# Patient Record
Sex: MALE | Race: WHITE | NOT HISPANIC OR LATINO | Employment: OTHER | URBAN - METROPOLITAN AREA
[De-identification: names, ages, dates, MRNs, and addresses within clinical notes are randomized per-mention and may not be internally consistent; named-entity substitution may affect disease eponyms.]

---

## 2023-08-29 PROBLEM — D22.39 MELANOCYTIC NEVI OF OTHER PARTS OF FACE: Status: ACTIVE | Noted: 2023-08-08

## 2023-08-29 PROBLEM — H16.223 KERATITIS SICCA, BILATERAL: Status: ACTIVE | Noted: 2023-08-29

## 2023-08-29 PROBLEM — M54.41 CHRONIC RIGHT-SIDED LOW BACK PAIN WITH RIGHT-SIDED SCIATICA: Status: ACTIVE | Noted: 2023-08-29

## 2023-08-29 PROBLEM — G47.33 OSA ON CPAP: Status: ACTIVE | Noted: 2023-08-29

## 2023-08-29 PROBLEM — H18.413 ARCUS SENILIS, BILATERAL: Status: ACTIVE | Noted: 2023-08-29

## 2023-08-29 PROBLEM — R20.2 PARESTHESIA OF SKIN: Status: ACTIVE | Noted: 2023-08-08

## 2023-08-29 PROBLEM — G89.29 CHRONIC RIGHT-SIDED LOW BACK PAIN WITH RIGHT-SIDED SCIATICA: Status: ACTIVE | Noted: 2023-08-29

## 2023-08-29 PROBLEM — M35.01 KERATITIS SICCA, BILATERAL (MULTI): Status: ACTIVE | Noted: 2023-08-29

## 2023-08-29 PROBLEM — E78.5 HYPERLIPIDEMIA: Status: ACTIVE | Noted: 2023-08-29

## 2023-08-29 PROBLEM — J45.909 ASTHMA (HHS-HCC): Status: ACTIVE | Noted: 2023-08-29

## 2023-08-29 PROBLEM — E11.9 TYPE 2 DIABETES MELLITUS (MULTI): Status: ACTIVE | Noted: 2023-08-29

## 2023-08-29 PROBLEM — D22.60 MELANOCYTIC NEVI OF UNSPECIFIED UPPER LIMB, INCLUDING SHOULDER: Status: ACTIVE | Noted: 2023-08-08

## 2023-08-29 PROBLEM — H02.836 DERMATOCHALASIS OF BOTH EYELIDS: Status: ACTIVE | Noted: 2023-08-29

## 2023-08-29 PROBLEM — I73.00 RAYNAUD'S PHENOMENON: Status: ACTIVE | Noted: 2023-08-29

## 2023-08-29 PROBLEM — D18.01 HEMANGIOMA OF SKIN AND SUBCUTANEOUS TISSUE: Status: ACTIVE | Noted: 2023-08-08

## 2023-08-29 PROBLEM — H02.833 DERMATOCHALASIS OF BOTH EYELIDS: Status: ACTIVE | Noted: 2023-08-29

## 2023-08-29 PROBLEM — L81.4 OTHER MELANIN HYPERPIGMENTATION: Status: ACTIVE | Noted: 2023-08-08

## 2023-08-29 PROBLEM — N50.9 DISORDER OF SCROTUM: Status: ACTIVE | Noted: 2023-08-29

## 2023-08-29 PROBLEM — L90.6 STRIAE ATROPHICAE: Status: ACTIVE | Noted: 2023-08-08

## 2023-08-29 PROBLEM — M48.061 LUMBAR SPINAL STENOSIS: Status: ACTIVE | Noted: 2023-08-29

## 2023-08-29 PROBLEM — I10 BENIGN ESSENTIAL HYPERTENSION: Status: ACTIVE | Noted: 2023-08-29

## 2023-08-29 PROBLEM — L57.0 ACTINIC KERATOSIS: Status: ACTIVE | Noted: 2023-08-08

## 2023-08-29 PROBLEM — F32.5 DEPRESSION, MAJOR, IN REMISSION (CMS-HCC): Status: ACTIVE | Noted: 2023-08-29

## 2023-08-29 PROBLEM — H02.403 ACQUIRED INVOLUTIONAL PTOSIS OF BOTH EYELIDS: Status: ACTIVE | Noted: 2023-08-29

## 2023-08-29 PROBLEM — D22.70 MELANOCYTIC NEVI OF LOWER LIMB, INCLUDING HIP: Status: ACTIVE | Noted: 2023-08-08

## 2023-08-29 PROBLEM — L72.3 SEBACEOUS CYST: Status: ACTIVE | Noted: 2023-08-08

## 2023-08-29 PROBLEM — K21.9 GERD WITHOUT ESOPHAGITIS: Status: ACTIVE | Noted: 2023-08-29

## 2023-08-29 PROBLEM — L50.3 DERMATOGRAPHIC URTICARIA: Status: ACTIVE | Noted: 2023-08-08

## 2023-08-29 PROBLEM — D49.2 NEOPLASM OF UNSPECIFIED BEHAVIOR OF BONE, SOFT TISSUE, AND SKIN: Status: ACTIVE | Noted: 2023-08-08

## 2023-08-29 PROBLEM — L71.9 ROSACEA, UNSPECIFIED: Status: ACTIVE | Noted: 2023-08-08

## 2023-08-29 PROBLEM — L82.1 OTHER SEBORRHEIC KERATOSIS: Status: ACTIVE | Noted: 2023-08-08

## 2023-08-29 RX ORDER — BLOOD SUGAR DIAGNOSTIC
STRIP MISCELLANEOUS
COMMUNITY
Start: 2021-10-18

## 2023-08-29 RX ORDER — SENNOSIDES 8.6 MG/1
1 TABLET ORAL DAILY PRN
COMMUNITY
Start: 2022-03-17 | End: 2023-12-19 | Stop reason: WASHOUT

## 2023-08-29 RX ORDER — ACETAMINOPHEN 325 MG/1
1-2 TABLET ORAL EVERY 4 HOURS PRN
COMMUNITY
Start: 2021-07-12 | End: 2023-12-19 | Stop reason: WASHOUT

## 2023-08-29 RX ORDER — POLYETHYLENE GLYCOL 3350 17 G/17G
17 POWDER, FOR SOLUTION ORAL DAILY
COMMUNITY
Start: 2022-06-20

## 2023-08-29 RX ORDER — LANCETS
EACH MISCELLANEOUS
COMMUNITY

## 2023-08-29 RX ORDER — OMEPRAZOLE 40 MG/1
40 CAPSULE, DELAYED RELEASE ORAL DAILY
COMMUNITY
Start: 2016-10-12 | End: 2024-05-23

## 2023-08-29 RX ORDER — DOCUSATE SODIUM 100 MG/1
1 CAPSULE, LIQUID FILLED ORAL 2 TIMES DAILY
COMMUNITY
Start: 2022-03-17 | End: 2023-12-19 | Stop reason: WASHOUT

## 2023-08-29 RX ORDER — KETOCONAZOLE 20 MG/G
CREAM TOPICAL DAILY
COMMUNITY
Start: 2021-02-25 | End: 2024-03-04 | Stop reason: SDUPTHER

## 2023-08-29 RX ORDER — NEBIVOLOL 10 MG/1
1 TABLET ORAL DAILY
COMMUNITY
Start: 2021-04-16 | End: 2023-10-18 | Stop reason: SDUPTHER

## 2023-08-29 RX ORDER — KETOCONAZOLE 20 MG/ML
SHAMPOO, SUSPENSION TOPICAL
COMMUNITY
Start: 2022-10-06 | End: 2024-03-04 | Stop reason: SDUPTHER

## 2023-08-29 RX ORDER — CELECOXIB 100 MG/1
1 CAPSULE ORAL DAILY
COMMUNITY
Start: 2016-02-04 | End: 2023-12-19 | Stop reason: WASHOUT

## 2023-08-29 RX ORDER — METRONIDAZOLE 7.5 MG/G
GEL TOPICAL
COMMUNITY
Start: 2023-05-02 | End: 2023-10-18 | Stop reason: SDUPTHER

## 2023-08-29 RX ORDER — VIT C/E/ZN/COPPR/LUTEIN/ZEAXAN 250MG-90MG
0.5 CAPSULE ORAL 2 TIMES DAILY
COMMUNITY
Start: 2020-03-09

## 2023-08-29 RX ORDER — MONTELUKAST SODIUM 10 MG/1
10 TABLET ORAL NIGHTLY
COMMUNITY
Start: 2019-04-02 | End: 2024-03-10

## 2023-08-29 RX ORDER — SULFAMETHOXAZOLE AND TRIMETHOPRIM 800; 160 MG/1; MG/1
TABLET ORAL
COMMUNITY
Start: 2022-10-03 | End: 2023-12-19 | Stop reason: WASHOUT

## 2023-08-29 RX ORDER — NIFEDIPINE 30 MG/1
1 TABLET, EXTENDED RELEASE ORAL DAILY
COMMUNITY
Start: 2015-02-25 | End: 2024-03-10

## 2023-08-29 RX ORDER — VENLAFAXINE HYDROCHLORIDE 150 MG/1
2 CAPSULE, EXTENDED RELEASE ORAL DAILY
COMMUNITY
Start: 2015-11-03 | End: 2024-04-08 | Stop reason: SDUPTHER

## 2023-08-29 RX ORDER — OXYCODONE HYDROCHLORIDE 5 MG/1
5 TABLET ORAL EVERY 6 HOURS PRN
COMMUNITY
Start: 2021-07-12 | End: 2023-12-19 | Stop reason: WASHOUT

## 2023-08-29 RX ORDER — ONDANSETRON 4 MG/1
4 TABLET, ORALLY DISINTEGRATING ORAL EVERY 8 HOURS
COMMUNITY
Start: 2022-06-20 | End: 2023-12-19 | Stop reason: WASHOUT

## 2023-08-29 RX ORDER — MULTIVIT WITH MINERALS/HERBS
1 TABLET ORAL DAILY
COMMUNITY
Start: 2021-01-21

## 2023-08-29 RX ORDER — ATORVASTATIN CALCIUM 40 MG/1
1 TABLET, FILM COATED ORAL NIGHTLY
COMMUNITY
Start: 2021-04-16 | End: 2024-03-14

## 2023-08-29 RX ORDER — TIZANIDINE 4 MG/1
TABLET ORAL
COMMUNITY
Start: 2021-02-03 | End: 2023-10-18

## 2023-08-29 RX ORDER — HYDROCHLOROTHIAZIDE 25 MG/1
1 TABLET ORAL DAILY
COMMUNITY
Start: 2014-02-23 | End: 2024-03-10

## 2023-08-29 RX ORDER — FLUTICASONE PROPIONATE 50 MCG
1 SPRAY, SUSPENSION (ML) NASAL 2 TIMES DAILY
COMMUNITY
Start: 2022-07-21

## 2023-08-29 RX ORDER — BLOOD-GLUCOSE CONTROL, NORMAL
EACH MISCELLANEOUS
COMMUNITY

## 2023-08-29 RX ORDER — METFORMIN HYDROCHLORIDE 500 MG/1
500 TABLET, EXTENDED RELEASE ORAL 2 TIMES DAILY
COMMUNITY
Start: 2017-08-03

## 2023-08-29 RX ORDER — TROSPIUM CHLORIDE 20 MG/1
TABLET, FILM COATED ORAL
COMMUNITY
Start: 2022-05-10

## 2023-08-29 RX ORDER — IBUPROFEN 800 MG/1
800 TABLET ORAL EVERY 8 HOURS PRN
COMMUNITY
Start: 2021-12-27

## 2023-08-29 RX ORDER — DOXYCYCLINE HYCLATE 20 MG
20 TABLET ORAL 2 TIMES DAILY
COMMUNITY
Start: 2021-04-08 | End: 2024-05-13

## 2023-08-29 RX ORDER — LAMOTRIGINE 25 MG/1
25 TABLET ORAL DAILY
COMMUNITY

## 2023-08-29 RX ORDER — ALBUTEROL SULFATE 90 UG/1
AEROSOL, METERED RESPIRATORY (INHALATION)
COMMUNITY
Start: 2016-02-04

## 2023-10-05 ENCOUNTER — OFFICE VISIT (OUTPATIENT)
Dept: SLEEP MEDICINE | Facility: HOSPITAL | Age: 63
End: 2023-10-05
Payer: MEDICARE

## 2023-10-05 VITALS
OXYGEN SATURATION: 96 % | BODY MASS INDEX: 38.14 KG/M2 | HEIGHT: 70 IN | TEMPERATURE: 98.1 F | WEIGHT: 266.4 LBS | RESPIRATION RATE: 16 BRPM | HEART RATE: 71 BPM | SYSTOLIC BLOOD PRESSURE: 122 MMHG | DIASTOLIC BLOOD PRESSURE: 78 MMHG

## 2023-10-05 DIAGNOSIS — G47.33 OSA ON CPAP: Primary | ICD-10-CM

## 2023-10-05 PROCEDURE — 3078F DIAST BP <80 MM HG: CPT | Performed by: INTERNAL MEDICINE

## 2023-10-05 PROCEDURE — 1036F TOBACCO NON-USER: CPT | Performed by: INTERNAL MEDICINE

## 2023-10-05 PROCEDURE — 3074F SYST BP LT 130 MM HG: CPT | Performed by: INTERNAL MEDICINE

## 2023-10-05 PROCEDURE — 99214 OFFICE O/P EST MOD 30 MIN: CPT | Performed by: INTERNAL MEDICINE

## 2023-10-05 SDOH — ECONOMIC STABILITY: FOOD INSECURITY: WITHIN THE PAST 12 MONTHS, THE FOOD YOU BOUGHT JUST DIDN'T LAST AND YOU DIDN'T HAVE MONEY TO GET MORE.: NEVER TRUE

## 2023-10-05 SDOH — ECONOMIC STABILITY: FOOD INSECURITY: WITHIN THE PAST 12 MONTHS, YOU WORRIED THAT YOUR FOOD WOULD RUN OUT BEFORE YOU GOT MONEY TO BUY MORE.: NEVER TRUE

## 2023-10-05 ASSESSMENT — ENCOUNTER SYMPTOMS
DEPRESSION: 0
OCCASIONAL FEELINGS OF UNSTEADINESS: 0
LOSS OF SENSATION IN FEET: 0

## 2023-10-05 ASSESSMENT — PAIN SCALES - GENERAL: PAINLEVEL: 6

## 2023-10-05 NOTE — PROGRESS NOTES
[facility] Sleep [facility]  Follow-up Visit Note        HISTORY OF PRESENT ILLNESS     The patient's referring provider is: No ref. provider found    HISTORY OF PRESENT ILLNESS   Dakotah Orta is a 63 y.o. male with PMHx of HTN, sciatica, depression HLD, DM2 who presents to a [facility] for follow-up.    PAST SLEEP HISTORY  At today's visit, patient reports:    # RACHEL  - currently using BPAP 8/4cwp  - does not have machine today and no download is available  - reports needs new mask, and feels pressures are still good  - states sleep is more refreshing with PAP therapy    Sleep Schedule:   Bedtime: 11 AM/PM: pm   Sleep latency: 10-30 minutes  Wake time: 6 AM/PM: am   Nocturnal awakenings: 2-3x/night and last for <5 minutes  Total sleep time average/day: 8 :00 hours/day  Naps:  YES wildcard/NO: Yes - 1.5 hours in the morning   Naps ARE/ARENOT: are refreshing.        ESS: 5  VADIM 10  FOSQ 29      PAP Adherence:  DURABLE MEDICAL EQUIPMENT COMPANY: MEDICAL [employer]ICE [employer]  ISSUES WITH THERAPY: denies  PERCEIVED BENEFITS OF PAP: refreshing sleep better sleep quality        ALLERGIES AND MEDICATIONS     ALLERGIES  No Known Allergies    MEDICATIONS  Current Outpatient Medications   Medication Sig Dispense Refill    acetaminophen (Tylenol) 325 mg tablet Take 1-2 tablets (325-650 mg) by mouth every 4 hours if needed.      albuterol 90 mcg/actuation inhaler       atorvastatin (Lipitor) 40 mg tablet Take 1 tablet (40 mg) by mouth once daily at bedtime.      cholecalciferol (Vitamin D-3) 25 MCG (1000 UT) capsule Take 0.5 capsules by mouth 2 times a day.      dextran 70-hypromellose drops Administer 1 drop into both eyes if needed.      doxycycline (Periostat) 20 mg tablet Take 1 tablet (20 mg) by mouth 2 times a day.      fluticasone (Flonase) 50 mcg/actuation nasal spray Administer 1 spray into each nostril 2 times a day.      hydroCHLOROthiazide (HYDRODiuril) 25 mg tablet Take 1  tablet (25 mg) by mouth once daily.      ibuprofen 800 mg tablet Take 1 tablet (800 mg) by mouth every 8 hours if needed for mild pain (1 - 3).      ketoconazole (NIZOral) 2 % cream Apply topically once daily.      ketoconazole (NIZOral) 2 % shampoo 1 Application      lamoTRIgine (LaMICtal) 25 mg tablet Take 1 tablet (25 mg) by mouth once daily.      lancets (Fingerstix Lancets) misc Test twice daily      lancets 30 gauge misc Check fasting blood glucose daily      metFORMIN XR (Glucophage-XR) 500 mg 24 hr tablet Take 1 tablet (500 mg) by mouth 2 times a day.      nebivolol (Bystolic) 10 mg tablet Take 1 tablet (10 mg) by mouth once daily.      NIFEdipine XL 30 mg 24 hr tablet Take 1 tablet (30 mg) by mouth once daily.      omeprazole (PriLOSEC) 40 mg DR capsule Take 1 capsule (40 mg) by mouth once daily.      ondansetron ODT (Zofran-ODT) 4 mg disintegrating tablet Take 1 tablet (4 mg) by mouth every 8 hours.      OneTouch Ultra Test strip Check fasting blood sugar once daily.  Record in log book.      polyethylene glycol (Miralax) 17 gram/dose powder Take 17 g by mouth once daily.      sennosides (Senokot) 8.6 mg tablet Take 1 tablet (8.6 mg) by mouth once daily as needed for constipation.      trospium (Sanctura) 20 mg tablet Take by mouth.      venlafaxine XR (Effexor-XR) 150 mg 24 hr capsule Take 2 capsules (300 mg) by mouth once daily.      vitamin B complex tablet Take 1 tablet by mouth once daily.      calcium carbonate/vitamin D3 (CALCIUM 600 + D,3, ORAL) Take by mouth.      celecoxib (CeleBREX) 100 mg capsule Take 1 capsule (100 mg) by mouth once daily.      docusate sodium (Colace) 100 mg capsule Take 1 capsule (100 mg) by mouth 2 times a day.      metroNIDAZOLE (Metrogel) 0.75 % gel       montelukast (Singulair) 10 mg tablet Take 1 tablet (10 mg) by mouth once daily at bedtime.      oxyCODONE (Roxicodone) 5 mg immediate release tablet Take 1 tablet (5 mg) by mouth every 6 hours if needed (breakthrough  pain).      sulfamethoxazole-trimethoprim (Bactrim DS) 800-160 mg tablet       tiZANidine (Zanaflex) 4 mg tablet        No current facility-administered medications for this visit.         PAST MEDICAL HISTORY   Past Medical History:   Diagnosis Date    Abnormal weight loss 11/24/2014    Weight loss    Acute ethmoidal sinusitis, unspecified 06/15/2017    Acute ethmoidal sinusitis    Acute frontal sinusitis, unspecified 05/09/2016    Acute frontal sinusitis    Age-related nuclear cataract, bilateral 07/24/2018    Nuclear sclerotic cataract of both eyes    Body mass index (BMI)40.0-44.9, adult 05/24/2019    BMI 40.0-44.9, adult    Cellulitis of abdominal wall 06/16/2017    Cellulitis of abdominal wall    Cellulitis of right upper limb 06/28/2016    Cellulitis of right upper extremity    Cellulitis, unspecified 07/24/2018    MRSA cellulitis    Cellulitis, unspecified 06/29/2017    MRSA cellulitis    Chronic ethmoidal sinusitis 07/24/2018    Chronic anterior ethmoidal sinusitis    Chronic ethmoidal sinusitis 01/14/2020    Chronic anterior ethmoidal sinusitis    Diseases of lips 05/09/2016    Lesion of lip    Disorder of lacrimal system, unspecified 07/24/2018    Disorder of nasolacrimal duct    Dyskinesia of esophagus 02/22/2019    Esophageal dysmotility    Dysphagia, unspecified 07/24/2018    Dysphagia    Dysphonia 06/15/2017    Dysphonia    Encounter for immunization 09/04/2020    Encounter for immunization    Encounter for screening for malignant neoplasm of prostate 02/16/2017    Encounter for prostate cancer screening    Encounter for screening for other viral diseases 08/28/2015    Need for hepatitis C screening test    Epigastric pain 07/24/2018    Epigastric pain    Gastric ulcer, unspecified as acute or chronic, without hemorrhage or perforation 07/18/2018    Antral ulcer    Generalized anxiety disorder 04/14/2020    Anxiety in acute stress reaction    Glossitis 06/08/2017    Tongue ulcer    Hemorrhage of anus  and rectum 10/30/2018    BRBPR (bright red blood per rectum)    Hypersomnia, unspecified 02/04/2016    Hypersomnia    Hypertrophy of nasal turbinates 07/13/2017    Nasal turbinate hypertrophy    Infectious mononucleosis, unspecified without complication 02/16/2017    Mononucleosis    Long term (current) use of non-steroidal anti-inflammatories (nsaid) 05/27/2015    NSAID long-term use    Lower abdominal pain, unspecified 07/31/2018    Lower abdominal pain    Osteoarthritis of knee, unspecified 04/27/2016    Osteoarthritis of knee    Other conditions influencing health status 07/24/2018    Colonoscopy planned    Other conditions influencing health status 02/26/2019    History of chronic diarrhea    Other diseases of tongue 06/15/2017    Lesion of tongue    Other forms of stomatitis 05/09/2016    Hard palate ulcer    Pain in unspecified ankle and joints of unspecified foot 10/20/2016    Ankle pain    Personal history of other diseases of the digestive system 08/20/2019    History of fatty infiltration of liver    Personal history of other diseases of the digestive system 07/18/2018    History of hiatal hernia    Personal history of other diseases of the digestive system 07/27/2017    History of ventral hernia    Personal history of other diseases of the digestive system 02/22/2019    History of esophageal stricture    Personal history of other diseases of the digestive system 07/24/2018    History of umbilical hernia    Personal history of other diseases of the musculoskeletal system and connective tissue     History of degenerative disc disease    Personal history of other diseases of the musculoskeletal system and connective tissue 03/26/2020    History of gout    Personal history of other diseases of the musculoskeletal system and connective tissue     Personal history of Osgood-Schlatter disease    Personal history of other diseases of the nervous system and sense organs 07/02/2015    History of acute otitis media     Personal history of other diseases of the nervous system and sense organs 02/16/2017    History of drainage from eye    Personal history of other diseases of the nervous system and sense organs 10/20/2016    History of conjunctivitis    Personal history of other diseases of the respiratory system 11/24/2014    H/O airway obstruction    Personal history of other diseases of the respiratory system     Personal history of chronic sinusitis    Personal history of other diseases of the respiratory system 07/02/2019    History of chronic sinusitis    Personal history of other diseases of the respiratory system 12/02/2015    History of chronic rhinitis    Personal history of other diseases of the respiratory system 02/04/2020    History of deviated nasal septum    Personal history of other drug therapy 09/14/2017    History of influenza vaccination    Personal history of other endocrine, nutritional and metabolic disease     History of obesity    Personal history of other endocrine, nutritional and metabolic disease 06/16/2017    History of goiter    Personal history of other specified conditions 08/27/2019    History of chest pain    Personal history of other specified conditions 07/24/2018    History of neck swelling    Personal history of other specified conditions 02/22/2019    History of epigastric pain    Personal history of other specified conditions 06/15/2017    History of postnasal drip    Personal history of other specified conditions 07/13/2017    History of postnasal drip    Personal history of other specified conditions 04/17/2018    History of dysphagia    Personal history of other specified conditions 02/04/2020    History of nasal congestion    Personal history of other specified conditions 04/14/2020    History of bradycardia    Personal history of other specified conditions 12/16/2020    History of fever    Personal history of other specified conditions 11/14/2017    History of nasal congestion     Personal history of other specified conditions     History of neck swelling    Personal history of other specified conditions 06/23/2015    History of edema    Plantar fascial fibromatosis     Plantar fasciitis    Spondylosis without myelopathy or radiculopathy, cervical region 03/03/2020    Cervical spine arthritis    Type 2 diabetes mellitus with mild nonproliferative diabetic retinopathy without macular edema, unspecified eye (CMS/[facility]) 07/24/2018    Mild nonproliferative diabetic retinopathy without macular edema associated with type 2 diabetes mellitus    Type 2 diabetes mellitus without complications (CMS/[facility]) 07/24/2018    Diabetes type 2, no ocular involvement    Unspecified acute noninfective otitis externa, bilateral 11/04/2015    Acute otitis externa of both ears, unspecified type    Unspecified otitis externa, right ear 07/02/2019    Right otitis externa    Unspecified otitis externa, unspecified ear 09/18/2014    Otitis externa    Varicose veins of other specified sites     Varicosities        PAST SURGICAL HISTORY  Past Surgical History:   Procedure Laterality Date    CARPAL TUNNEL RELEASE  03/12/2014    Neuroplasty Decompression Median Nerve At Carpal Tunnel    COLONOSCOPY  07/12/2021    Colonoscopy (Fiberoptic)    OTHER SURGICAL HISTORY  07/12/2021    Cervical surgery    OTHER SURGICAL HISTORY  12/13/2022    Revision of hip arthroplasty    OTHER SURGICAL HISTORY  12/13/2022    Lower back surgery    OTHER SURGICAL HISTORY  12/13/2022    Ventral hernia repair    OTHER SURGICAL HISTORY  12/13/2022    Hip replacement    OTHER SURGICAL HISTORY  03/12/2014    Knee Arthroscopy With Medial And Lateral Meniscectomy    OTHER SURGICAL HISTORY  03/12/2014    Knee Arthroscopy With Abrasion Arthroplasty    ROTATOR CUFF REPAIR  03/12/2014    Rotator Cuff Repair    SINUS SURGERY  07/24/2018    Sinus Surgery    TONSILLECTOMY  12/10/2014    Tonsillectomy    TOTAL KNEE ARTHROPLASTY  05/08/2015    Knee Replacement     "    FAMILY HISTORY   Family History   Problem Relation Name Age of Onset    Breast cancer Mother      COPD Father      Colon cancer Father      COPD Other Grandfather         SOCIAL HISTORY  Social History     Socioeconomic History    Marital status:      Spouse name: Not on file    Number of children: Not on file    Years of education: Not on file    Highest education level: Not on file   Occupational History    Not on file   Tobacco Use    Smoking status: Never    Smokeless tobacco: Never   Vaping Use    Vaping Use: Never used   Substance and Sexual Activity    Alcohol use: Never    Drug use: Never    Sexual activity: Not on file   Other Topics Concern    Not on file   Social History Narrative    Not on file     Social Determinants of Health     Financial Resource Strain: Not on file   Food Insecurity: No Food Insecurity (10/5/2023)    Hunger Vital Sign     Worried About Running Out of Food in the Last Year: Never true     Ran Out of Food in the Last Year: Never true   Transportation Needs: Not on file   Physical Activity: Not on file   Stress: Not on file   Social Connections: Not on file   Intimate Partner Violence: Not on file   Housing Stability: Not on file          PHYSICAL EXAM     VITAL SIGNS: /78 (BP Location: Right arm, Patient Position: Sitting, BP Cuff Size: Large adult)   Pulse 71   Temp 36.7 °C (98.1 °F) (Temporal)   Resp 16   Ht 1.778 m (5' 10\")   Wt 121 kg (266 lb 6.4 oz)   SpO2 96%   BMI 38.22 kg/m²      CURRENT WEIGHT:    Vitals:    10/05/23 1642   Weight: 121 kg (266 lb 6.4 oz)      BMI: Body mass index is 38.22 kg/m².   PREVIOUS WEIGHTS:  Wt Readings from Last 3 Encounters:   10/05/23 121 kg (266 lb 6.4 oz)   06/20/23 122 kg (270 lb)   12/13/22 118 kg (261 lb)       Physical Exam  Vitals and nursing note reviewed.   Constitutional:       General: He is not in acute distress.     Appearance: Normal appearance. He is obese. He is not ill-appearing or toxic-appearing.   HENT: "      Head: Normocephalic and atraumatic.      Right Ear: External ear normal.      Left Ear: External ear normal.      Nose: Nose normal. No congestion.      Mouth/Throat:      Mouth: Mucous membranes are moist.      Comments: Mallampati 3  Eyes:      Extraocular Movements: Extraocular movements intact.   Cardiovascular:      Rate and Rhythm: Normal rate and regular rhythm.      Heart sounds: Normal heart sounds. No murmur heard.  Pulmonary:      Effort: Pulmonary effort is normal. No respiratory distress.      Breath sounds: Normal breath sounds. No stridor. No wheezing, rhonchi or rales.   Musculoskeletal:         General: Normal range of motion.      Cervical back: Normal range of motion.   Skin:     General: Skin is warm and dry.      Comments: Rosacea   Neurological:      Mental Status: He is alert and oriented to person, place, and time.   Psychiatric:         Mood and Affect: Mood normal.         Behavior: Behavior normal.           RESULTS/DATA     Ferritin (ug/L)   Date Value   08/20/2019 206       PAP Adherence  PAP adherence download: not available at today's visit--does not have device         ASSESSMENT/PLAN     Dakotah Orta is a 63 y.o. male with PMHx Dakotah Orta is a 63 y.o. male with PMHx of HTN, sciatica, depression HLD, DM2 who presents for follow-up with:    Problem List Items Addressed This Visit       RACHEL on CPAP - Primary     - unable to bring in device to clinic today to review compliance report/download  - has long hx of good compliance with PAP, continue BPAP 8/4cwp  - needs new supplies--will place order for new mask and supplies today   - RTC in 1 year         Relevant Orders    Positive Airway Pressure (PAP) Therapy        Follow-up in 1 year.

## 2023-10-05 NOTE — ASSESSMENT & PLAN NOTE
- unable to bring in device to clinic today to review compliance report/download  - has long hx of good compliance with PAP, continue BPAP 8/4cwp  - needs new supplies--will place order for new mask and supplies today   - RTC in 1 year

## 2023-10-05 NOTE — PATIENT INSTRUCTIONS
[facility] Sleep Medicine  [address]  [facility] [address] FLOOR  [facility] [address]       NAME: Dakotah Orta   DATE: 10/05/23     Your Sleep Provider Today: Edward Gutierres MD  Your Primary Care Physician: Larissa Jesus MD   Your Referring Provider: No ref. provider found    DIAGNOSIS:   1. RACHEL on CPAP  Positive Airway Pressure (PAP) Therapy          Thank you for coming to the Sleep Medicine Clinic today! Your sleep medicine provider today was: Edward Gutierres MD Below is a summary of your treatment plan, other important information, and our contact numbers:      TREATMENT PLAN     Thank you for coming in today!    Continue to use your device.         Follow-up Appointment:   Follow-up in 1 year.      IMPORTANT INFORMATION     Call 911 for medical emergencies.  Our offices are generally open from Monday-Friday, 9 am - 5 pm.  If you need to get in touch with me, you may either call me and my team(number is below) or you can use [employer].  If a referral for a test, for CPAP, or for another specialist was made, and you have not heard about scheduling this within a week, please call scheduling at [phone]).  If you are unable to make your appointment for clinic or an overnight study, kindly call the office at least 48 hours in advance to cancel and reschedule.  If you are on CPAP, please bring your device's card or the device to each clinic appointment.   There are no supporting services by either the sleep doctors or their staff on weekends and Holidays, or after 5 PM on weekdays.   If you have been asked to come to a sleep study, make sure you bring toiletries, a comfy pillow, and any nighttime medications that you may regularly take. Also be sure to eat dinner before you arrive. We generally do not provide meals.      PRESCRIPTIONS     We require 7 days advanced notice for prescription refills. If we do not receive the request in this time, we cannot guarantee that your  medication will be refilled in time.      IMPORTANT PHONE NUMBERS     Sleep Medicine Clinic Fax: [phone]  Appointments (for Pediatric Sleep Clinic): [phone] - option 1  Appointments (for Adult Sleep Clinic): [phone] - option 2  Appointments (For Sleep Studies): [phone] - option 3  Behavioral Sleep Medicine: [phone]  Sleep Surgery: [phone]  ENT (Otolaryngology): [phone]  Headache Clinic (Neurology): [phone]  Neurology: [phone]  Psychiatry: [phone]  Pulmonary Function Testing (PFT) Center: [phone]  Pulmonary Medicine: [phone]  [employer] (DME): [phone]  [employer] (DME): [phone]  [facility] ([facility]): [phone]      OUR ADULT SLEEP MEDICINE TEAM   Please do not hesitate to call the office or sleep nurse with any questions between appointments:    Adult Sleep Nurses (Radha Benton, RN and Cristiane Jasmine RN):  For clinical questions and refilling prescriptions: [phone]  Email sleep diaries and other documents at: [email or web address]    Adult Sleep Medicine Secretaries:  Lori Acevedo (For [place]/Lu/Krise/Strohl/Yeh/Chaudhry):   P: [phone]  F: [phone]  eKlsi Fernandez (For [place]): P: [phone]  Fax: [phone]  Jessie Muhammad (For [place]cevic/Blank): P: [phone]  F: [phone]  Khalida Arceo (For [place]): P: [phone]  F: [phone]  Lian Curiel (For Zahida/Sher/Zakhary): P: [phone]  F: [phone]  Jeanette Dunbar (For Renee/Crum): P: [phone]  F: [phone]     Adult Sleep Medicine Advanced Practice Providers:  Vitaly Roblero ([place], [place])  Christi Olivarez ([facility])  Cassie Palmer CNP ([place], [place], [facility])  Sierra Phipps CNP ([place], [place], [facility])  Micaela Avilez ([facility], [facility], [facility])  Chidi Crum CNP ([facility])        OUR SLEEP TESTING LOCATIONS     Our team will  "contact you to schedule your sleep study, however, you can contact us as follow:  Main Phone Line (scheduling only): [phone]), option 3  Adult and Pediatric Locations   [place] (6 years and older): [facility] - 4th floor ([address], [facility]) After hours line: [phone]  [facility] (Main campus: All ages): [facility], 6th floor. After hours line: [phone]   [place] (5 years and older; younger considered on case-by-case basis): [address]; [address], [address]. Scheduling  After hours line: [phone]   [place] (6 years and older): [address]; [address]; [address]   [place] (6 years and older): [address] [facility], [address]  After hours line: [phone]   [place] (13 years and older) in [place]: [address], 2nd floor  After hours line: [phone]   [place] (13 year and older): [address]  After hours line: [phone]     Adult Only Locations:   [place] (18 years and older): [address] [facility], 2nd floor   [place] (18 years and older): [address] [facility]; 4th floor  After hours line: [phone]  [facility] (18 years and older) at [place]: [id number] [place] [address]  After hours line: [phone]          CONTACTING YOUR SLEEP MEDICINE PROVIDER     Send a message directly to your provider through \"My Chart\", which is the email service through your  Records Account: [email or web address]   Call [phone] and leave a message. One of the administrative assistants will forward the message to your sleep medicine provider through \"My Chart\" and/or email.     Your sleep medicine provider for this visit was: Edward Gutierres MD   "

## 2023-10-12 ENCOUNTER — OFFICE VISIT (OUTPATIENT)
Dept: DERMATOLOGY | Facility: CLINIC | Age: 63
End: 2023-10-12
Payer: MEDICARE

## 2023-10-12 DIAGNOSIS — Z12.83 SKIN CANCER SCREENING: Primary | ICD-10-CM

## 2023-10-12 DIAGNOSIS — L71.9 ROSACEA: ICD-10-CM

## 2023-10-12 PROCEDURE — 1036F TOBACCO NON-USER: CPT | Performed by: NURSE PRACTITIONER

## 2023-10-12 PROCEDURE — 99213 OFFICE O/P EST LOW 20 MIN: CPT | Performed by: NURSE PRACTITIONER

## 2023-10-12 ASSESSMENT — DERMATOLOGY PATIENT ASSESSMENT: DO YOU HAVE ANY NEW OR CHANGING LESIONS: NO

## 2023-10-12 ASSESSMENT — DERMATOLOGY QUALITY OF LIFE (QOL) ASSESSMENT
ARE THERE EXCLUSIONS OR EXCEPTIONS FOR THE QUALITY OF LIFE ASSESSMENT: NO
WHAT SINGLE SKIN CONDITION LISTED BELOW IS THE PATIENT ANSWERING THE QUALITY-OF-LIFE ASSESSMENT QUESTIONS ABOUT: NONE OF THE ABOVE
DATE THE QUALITY-OF-LIFE ASSESSMENT WAS COMPLETED: 66759

## 2023-10-12 ASSESSMENT — ITCH NUMERIC RATING SCALE: HOW SEVERE IS YOUR ITCHING?: 0

## 2023-10-12 NOTE — PROGRESS NOTES
Subjective     Dakotah Orta is a 63 y.o. male who presents for the following: Skin Check.     Review of Systems:  No other skin or systemic complaints other than what is documented elsewhere in the note.    The following portions of the chart were reviewed this encounter and updated as appropriate:          Skin Cancer History  No skin cancer on file.      Specialty Problems          Dermatology Problems    Actinic keratosis    Dermatographic urticaria    Hemangioma of skin and subcutaneous tissue    Melanocytic nevi of lower limb, including hip    Melanocytic nevi of other parts of face    Melanocytic nevi of unspecified upper limb, including shoulder    Other melanin hyperpigmentation    Other seborrheic keratosis    Rosacea, unspecified    Sebaceous cyst    Striae atrophicae        Objective   Well appearing patient in no apparent distress; mood and affect are within normal limits.    A focused skin examination was performed. All findings within normal limits unless otherwise noted below.    Assessment/Plan   1. Skin cancer screening  Benign appearing lesions.    -These lesions have benign, reassuring patterns on dermoscopy.  -There were no concerning features found on exam today.  -Recommend continued self-observation, and to contact the office if any changes in nevi are  noticed.    Benign appearing nevi  Instructions: Monthly self-skin checks to monitor for any changes in moles are recommended. Expectations: Benign Nevi are pigmented nests of cells within the skin. No treatment is necessary.     Lentigines, self limited  -Benign, Reassurance  -Recommend continued observation.    Molina Angiomas, self limited  -Benign, reassurance  -Recommend non-intervention and continued observation.    Seborrheic Keratoses, self limited  -Benign, reassurance  -These are healthy, benign growths of the skin. They are not dangerous and do not require any  treatment.  -Recommend non-intervention and continued  observation.    Discussed/information given on safe sun practices and use of sunscreen, sun protective clothing or sun avoidance. Recommend to use OTC medication of sunscreen SPF 30 or higher on a daily basis prior to sun exposure to reduce the risk of skin cancer.    Contact Office if: Any lesions change in size, shape or color; itch, bum or bleed.    2. Rosacea  Head - Anterior (Face)  Erythema to malar cheeks with accompanying flushing sensation when exposed to triggers    Summary: You presented with symptoms consistent with rosacea, including persistent redness, flushing, and the presence of papules and pustules on the central portion of your face. During our initial consultation, we discussed the possible triggers for your rosacea flare-ups, such as exposure to sunlight, hot and spicy foods, alcohol, and certain skincare products. We also addressed the importance of implementing a tailored skincare routine and adhering to prescribed treatments to manage your condition effectively.    PLAN:  1. Medication:  ° You were prescribed a topical antibiotic, such as metronidazole gel or azelaic acid, to help reduce inflammation and control the papules and pustules. It is crucial to apply the medication as directed and consistently to achieve optimal results.  ° In severe cases or if topical treatment proves inadequate, oral antibiotics like doxycycline or tetracycline may be considered for a short duration. We will monitor your progress closely and adjust the treatment accordingly.    2. Skincare Routine:  ° We discussed the significance of a gentle skincare routine tailored to your skin type. You should continue using mild, non-irritating cleansers and moisturizers suitable for sensitive skin. Avoid products that contain alcohol, fragrances, or other potential irritants.  ° Daily use of a broad-spectrum sunscreen with a high SPF (30 or above) remains crucial to protect your skin from UV radiation, which can trigger  rosacea flare-ups. Reapply sunscreen every two hours, especially during prolonged sun exposure.     3. Lifestyle Modifications:  ° It is essential to identify and avoid triggers that exacerbate your rosacea symptoms. This may include limiting your intake of spicy foods, hot beverages, and alcohol. Keep a diary to track any potential triggers and share this information during future consultations.  ° Protecting your skin from extreme weather conditions, such as cold wind or excessive heat, is advised. Consider wearing a scarf or using a gentle barrier cream during winter or when exposed to harsh environmental conditions.    4. Follow-Up Appointment:  ° Regular follow-up visits are crucial to monitor your progress, assess the effectiveness of the prescribed treatments, and make any necessary adjustments to your management plan.    Please remember that rosacea is a chronic condition, but with appropriate management and lifestyle modifications, its symptoms can be controlled effectively. If you experience any concerns or notice changes in your symptoms between appointments, please do not hesitate to contact our office.    Can continue use metronidazole 0.75% cream twice daily.  Patient states he sometimes forgets to use but is overall happy with treatment

## 2023-10-12 NOTE — PROGRESS NOTES
Subjective     Dakotah Orta is a 63 y.o. male who presents for the following: Skin Check.     Review of Systems:  No other skin or systemic complaints other than what is documented elsewhere in the note.    The following portions of the chart were reviewed this encounter and updated as appropriate:       Skin Cancer History  No skin cancer on file.    Specialty Problems          Dermatology Problems    Actinic keratosis    Dermatographic urticaria    Hemangioma of skin and subcutaneous tissue    Melanocytic nevi of lower limb, including hip    Melanocytic nevi of other parts of face    Melanocytic nevi of unspecified upper limb, including shoulder    Other melanin hyperpigmentation    Other seborrheic keratosis    Rosacea, unspecified    Sebaceous cyst    Striae atrophicae     Past Medical History:  Dakotah Orta  has a past medical history of Abnormal weight loss (11/24/2014), Acute ethmoidal sinusitis, unspecified (06/15/2017), Acute frontal sinusitis, unspecified (05/09/2016), Age-related nuclear cataract, bilateral (07/24/2018), Body mass index (BMI)40.0-44.9, adult (05/24/2019), Cellulitis of abdominal wall (06/16/2017), Cellulitis of right upper limb (06/28/2016), Cellulitis, unspecified (07/24/2018), Cellulitis, unspecified (06/29/2017), Chronic ethmoidal sinusitis (07/24/2018), Chronic ethmoidal sinusitis (01/14/2020), Diseases of lips (05/09/2016), Disorder of lacrimal system, unspecified (07/24/2018), Dyskinesia of esophagus (02/22/2019), Dysphagia, unspecified (07/24/2018), Dysphonia (06/15/2017), Encounter for immunization (09/04/2020), Encounter for screening for malignant neoplasm of prostate (02/16/2017), Encounter for screening for other viral diseases (08/28/2015), Epigastric pain (07/24/2018), Gastric ulcer, unspecified as acute or chronic, without hemorrhage or perforation (07/18/2018), Generalized anxiety disorder (04/14/2020), Glossitis (06/08/2017), Hemorrhage of anus and rectum (10/30/2018),  Hypersomnia, unspecified (02/04/2016), Hypertrophy of nasal turbinates (07/13/2017), Infectious mononucleosis, unspecified without complication (02/16/2017), Long term (current) use of non-steroidal anti-inflammatories (nsaid) (05/27/2015), Lower abdominal pain, unspecified (07/31/2018), Osteoarthritis of knee, unspecified (04/27/2016), Other conditions influencing health status (07/24/2018), Other conditions influencing health status (02/26/2019), Other diseases of tongue (06/15/2017), Other forms of stomatitis (05/09/2016), Pain in unspecified ankle and joints of unspecified foot (10/20/2016), Personal history of other diseases of the digestive system (08/20/2019), Personal history of other diseases of the digestive system (07/18/2018), Personal history of other diseases of the digestive system (07/27/2017), Personal history of other diseases of the digestive system (02/22/2019), Personal history of other diseases of the digestive system (07/24/2018), Personal history of other diseases of the musculoskeletal system and connective tissue, Personal history of other diseases of the musculoskeletal system and connective tissue (03/26/2020), Personal history of other diseases of the musculoskeletal system and connective tissue, Personal history of other diseases of the nervous system and sense organs (07/02/2015), Personal history of other diseases of the nervous system and sense organs (02/16/2017), Personal history of other diseases of the nervous system and sense organs (10/20/2016), Personal history of other diseases of the respiratory system (11/24/2014), Personal history of other diseases of the respiratory system, Personal history of other diseases of the respiratory system (07/02/2019), Personal history of other diseases of the respiratory system (12/02/2015), Personal history of other diseases of the respiratory system (02/04/2020), Personal history of other drug therapy (09/14/2017), Personal history of other  endocrine, nutritional and metabolic disease, Personal history of other endocrine, nutritional and metabolic disease (06/16/2017), Personal history of other specified conditions (08/27/2019), Personal history of other specified conditions (07/24/2018), Personal history of other specified conditions (02/22/2019), Personal history of other specified conditions (06/15/2017), Personal history of other specified conditions (07/13/2017), Personal history of other specified conditions (04/17/2018), Personal history of other specified conditions (02/04/2020), Personal history of other specified conditions (04/14/2020), Personal history of other specified conditions (12/16/2020), Personal history of other specified conditions (11/14/2017), Personal history of other specified conditions, Personal history of other specified conditions (06/23/2015), Plantar fascial fibromatosis, Spondylosis without myelopathy or radiculopathy, cervical region (03/03/2020), Type 2 diabetes mellitus with mild nonproliferative diabetic retinopathy without macular edema, unspecified eye (CMS/[facility]) (07/24/2018), Type 2 diabetes mellitus without complications (CMS/[facility]) (07/24/2018), Unspecified acute noninfective otitis externa, bilateral (11/04/2015), Unspecified otitis externa, right ear (07/02/2019), Unspecified otitis externa, unspecified ear (09/18/2014), and Varicose veins of other specified sites.    Past Surgical History:  Dakotah Orta  has a past surgical history that includes Carpal tunnel release (03/12/2014); Other surgical history (07/12/2021); Sinus surgery (07/24/2018); Colonoscopy (07/12/2021); Tonsillectomy (12/10/2014); Total knee arthroplasty (05/08/2015); Other surgical history (12/13/2022); Other surgical history (12/13/2022); Other surgical history (12/13/2022); Other surgical history (12/13/2022); Other surgical history (03/12/2014); Other surgical history (03/12/2014); and Rotator cuff repair (03/12/2014).    Family History:   Patient family history includes Breast cancer in his mother; COPD in his father and another family member; Colon cancer in his father.    Social History:  Dakotah Orta  reports that he has never smoked. He has never used smokeless tobacco. He reports that he does not drink alcohol and does not use drugs.    Allergies:  Patient has no known allergies.    Current Medications / CAM's:    Current Outpatient Medications:     acetaminophen (Tylenol) 325 mg tablet, Take 1-2 tablets (325-650 mg) by mouth every 4 hours if needed., Disp: , Rfl:     albuterol 90 mcg/actuation inhaler, , Disp: , Rfl:     atorvastatin (Lipitor) 40 mg tablet, Take 1 tablet (40 mg) by mouth once daily at bedtime., Disp: , Rfl:     calcium carbonate/vitamin D3 (CALCIUM 600 + D,3, ORAL), Take by mouth., Disp: , Rfl:     celecoxib (CeleBREX) 100 mg capsule, Take 1 capsule (100 mg) by mouth once daily., Disp: , Rfl:     cholecalciferol (Vitamin D-3) 25 MCG (1000 UT) capsule, Take 0.5 capsules by mouth 2 times a day., Disp: , Rfl:     dextran 70-hypromellose drops, Administer 1 drop into both eyes if needed., Disp: , Rfl:     docusate sodium (Colace) 100 mg capsule, Take 1 capsule (100 mg) by mouth 2 times a day., Disp: , Rfl:     doxycycline (Periostat) 20 mg tablet, Take 1 tablet (20 mg) by mouth 2 times a day., Disp: , Rfl:     fluticasone (Flonase) 50 mcg/actuation nasal spray, Administer 1 spray into each nostril 2 times a day., Disp: , Rfl:     hydroCHLOROthiazide (HYDRODiuril) 25 mg tablet, Take 1 tablet (25 mg) by mouth once daily., Disp: , Rfl:     ibuprofen 800 mg tablet, Take 1 tablet (800 mg) by mouth every 8 hours if needed for mild pain (1 - 3)., Disp: , Rfl:     ketoconazole (NIZOral) 2 % cream, Apply topically once daily., Disp: , Rfl:     ketoconazole (NIZOral) 2 % shampoo, 1 Application, Disp: , Rfl:     lamoTRIgine (LaMICtal) 25 mg tablet, Take 1 tablet (25 mg) by mouth once daily., Disp: , Rfl:     lancets (Fingerstix Lancets) misc,  Test twice daily, Disp: , Rfl:     lancets 30 gauge misc, Check fasting blood glucose daily, Disp: , Rfl:     metFORMIN XR (Glucophage-XR) 500 mg 24 hr tablet, Take 1 tablet (500 mg) by mouth 2 times a day., Disp: , Rfl:     metroNIDAZOLE (Metrogel) 0.75 % gel, , Disp: , Rfl:     montelukast (Singulair) 10 mg tablet, Take 1 tablet (10 mg) by mouth once daily at bedtime., Disp: , Rfl:     nebivolol (Bystolic) 10 mg tablet, Take 1 tablet (10 mg) by mouth once daily., Disp: , Rfl:     NIFEdipine XL 30 mg 24 hr tablet, Take 1 tablet (30 mg) by mouth once daily., Disp: , Rfl:     omeprazole (PriLOSEC) 40 mg DR capsule, Take 1 capsule (40 mg) by mouth once daily., Disp: , Rfl:     ondansetron ODT (Zofran-ODT) 4 mg disintegrating tablet, Take 1 tablet (4 mg) by mouth every 8 hours., Disp: , Rfl:     OneTouch Ultra Test strip, Check fasting blood sugar once daily.  Record in log book., Disp: , Rfl:     oxyCODONE (Roxicodone) 5 mg immediate release tablet, Take 1 tablet (5 mg) by mouth every 6 hours if needed (breakthrough pain)., Disp: , Rfl:     polyethylene glycol (Miralax) 17 gram/dose powder, Take 17 g by mouth once daily., Disp: , Rfl:     sennosides (Senokot) 8.6 mg tablet, Take 1 tablet (8.6 mg) by mouth once daily as needed for constipation., Disp: , Rfl:     sulfamethoxazole-trimethoprim (Bactrim DS) 800-160 mg tablet, , Disp: , Rfl:     tiZANidine (Zanaflex) 4 mg tablet, , Disp: , Rfl:     trospium (Sanctura) 20 mg tablet, Take by mouth., Disp: , Rfl:     venlafaxine XR (Effexor-XR) 150 mg 24 hr capsule, Take 2 capsules (300 mg) by mouth once daily., Disp: , Rfl:     vitamin B complex tablet, Take 1 tablet by mouth once daily., Disp: , Rfl:      Objective   Well appearing patient in no apparent distress; mood and affect are within normal limits.    A full examination was performed including scalp, head, eyes, ears, nose, lips, neck, chest, axillae, abdomen, back, buttocks, bilateral upper extremities, bilateral  lower extremities, hands, feet, fingers, toes, fingernails, and toenails. All findings within normal limits unless otherwise noted below.    Assessment/Plan

## 2023-10-18 ENCOUNTER — TELEPHONE (OUTPATIENT)
Dept: PRIMARY CARE | Facility: CLINIC | Age: 63
End: 2023-10-18
Payer: MEDICARE

## 2023-10-18 DIAGNOSIS — M54.41 CHRONIC RIGHT-SIDED LOW BACK PAIN WITH RIGHT-SIDED SCIATICA: Primary | ICD-10-CM

## 2023-10-18 DIAGNOSIS — I10 BENIGN ESSENTIAL HYPERTENSION: Primary | ICD-10-CM

## 2023-10-18 DIAGNOSIS — G89.29 CHRONIC RIGHT-SIDED LOW BACK PAIN WITH RIGHT-SIDED SCIATICA: Primary | ICD-10-CM

## 2023-10-18 DIAGNOSIS — L71.9 ROSACEA: Primary | ICD-10-CM

## 2023-10-18 RX ORDER — TIZANIDINE 4 MG/1
TABLET ORAL
Qty: 180 TABLET | Refills: 10 | Status: SHIPPED | OUTPATIENT
Start: 2023-10-18

## 2023-10-18 RX ORDER — NEBIVOLOL 10 MG/1
5 TABLET ORAL DAILY
Qty: 45 TABLET | Refills: 3 | Status: SHIPPED | OUTPATIENT
Start: 2023-10-18 | End: 2023-12-19 | Stop reason: SDUPTHER

## 2023-10-18 RX ORDER — METRONIDAZOLE 7.5 MG/G
GEL TOPICAL
Qty: 45 G | Refills: 10 | Status: SHIPPED | OUTPATIENT
Start: 2023-10-18

## 2023-10-18 NOTE — TELEPHONE ENCOUNTER
Pt called for refill of:  Bystolic - generic Nebivolol 10 mg   Per pt he splits pills to be 5 mg  [facility]

## 2023-12-19 ENCOUNTER — OFFICE VISIT (OUTPATIENT)
Dept: PRIMARY CARE | Facility: CLINIC | Age: 63
End: 2023-12-19
Payer: MEDICARE

## 2023-12-19 VITALS
HEART RATE: 70 BPM | WEIGHT: 267.3 LBS | TEMPERATURE: 96.6 F | DIASTOLIC BLOOD PRESSURE: 81 MMHG | OXYGEN SATURATION: 97 % | SYSTOLIC BLOOD PRESSURE: 117 MMHG | BODY MASS INDEX: 38.35 KG/M2

## 2023-12-19 DIAGNOSIS — E78.2 MIXED HYPERLIPIDEMIA: ICD-10-CM

## 2023-12-19 DIAGNOSIS — E66.01 OBESITY, MORBID (MULTI): ICD-10-CM

## 2023-12-19 DIAGNOSIS — M35.01 KERATITIS SICCA, BILATERAL (MULTI): ICD-10-CM

## 2023-12-19 DIAGNOSIS — F32.5 DEPRESSION, MAJOR, IN REMISSION (CMS-HCC): ICD-10-CM

## 2023-12-19 DIAGNOSIS — I10 BENIGN ESSENTIAL HYPERTENSION: ICD-10-CM

## 2023-12-19 DIAGNOSIS — E11.65 TYPE 2 DIABETES MELLITUS WITH HYPERGLYCEMIA, WITHOUT LONG-TERM CURRENT USE OF INSULIN (MULTI): Primary | ICD-10-CM

## 2023-12-19 PROBLEM — N50.9 DISORDER OF SCROTUM: Status: RESOLVED | Noted: 2023-08-29 | Resolved: 2023-12-19

## 2023-12-19 PROBLEM — D49.2 NEOPLASM OF UNSPECIFIED BEHAVIOR OF BONE, SOFT TISSUE, AND SKIN: Status: RESOLVED | Noted: 2023-08-08 | Resolved: 2023-12-19

## 2023-12-19 PROCEDURE — 1036F TOBACCO NON-USER: CPT | Performed by: INTERNAL MEDICINE

## 2023-12-19 PROCEDURE — 99214 OFFICE O/P EST MOD 30 MIN: CPT | Performed by: INTERNAL MEDICINE

## 2023-12-19 PROCEDURE — 3079F DIAST BP 80-89 MM HG: CPT | Performed by: INTERNAL MEDICINE

## 2023-12-19 PROCEDURE — 3074F SYST BP LT 130 MM HG: CPT | Performed by: INTERNAL MEDICINE

## 2023-12-19 RX ORDER — NEBIVOLOL 5 MG/1
5 TABLET ORAL DAILY
Qty: 90 TABLET | Refills: 3 | Status: SHIPPED | OUTPATIENT
Start: 2023-12-19 | End: 2024-12-18

## 2023-12-19 ASSESSMENT — ENCOUNTER SYMPTOMS
PALPITATIONS: 0
WHEEZING: 0
CHILLS: 0
ARTHRALGIAS: 1
DIZZINESS: 0
FEVER: 0
COUGH: 0
LIGHT-HEADEDNESS: 0
SHORTNESS OF BREATH: 0
DYSURIA: 0
CONSTIPATION: 0
NAUSEA: 0
ABDOMINAL PAIN: 0
DIARRHEA: 0
FATIGUE: 0
HEMATURIA: 0
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
## 2024-01-24 ENCOUNTER — LAB (OUTPATIENT)
Dept: LAB | Facility: LAB | Age: 64
End: 2024-01-24
Payer: MEDICARE

## 2024-01-24 DIAGNOSIS — E78.2 MIXED HYPERLIPIDEMIA: ICD-10-CM

## 2024-01-24 DIAGNOSIS — E11.65 TYPE 2 DIABETES MELLITUS WITH HYPERGLYCEMIA, WITHOUT LONG-TERM CURRENT USE OF INSULIN (MULTI): ICD-10-CM

## 2024-01-24 DIAGNOSIS — I10 BENIGN ESSENTIAL HYPERTENSION: ICD-10-CM

## 2024-01-24 LAB
ALBUMIN SERPL BCP-MCNC: 4.4 G/DL (ref 3.4–5)
ALP SERPL-CCNC: 101 U/L (ref 33–136)
ALT SERPL W P-5'-P-CCNC: 14 U/L (ref 10–52)
ANION GAP SERPL CALC-SCNC: 12 MMOL/L (ref 10–20)
AST SERPL W P-5'-P-CCNC: 20 U/L (ref 9–39)
BASOPHILS # BLD AUTO: 0.05 X10*3/UL (ref 0–0.1)
BASOPHILS NFR BLD AUTO: 0.5 %
BILIRUB SERPL-MCNC: 0.4 MG/DL (ref 0–1.2)
BUN SERPL-MCNC: 18 MG/DL (ref 6–23)
CALCIUM SERPL-MCNC: 9.9 MG/DL (ref 8.6–10.3)
CHLORIDE SERPL-SCNC: 97 MMOL/L (ref 98–107)
CHOLEST SERPL-MCNC: 127 MG/DL (ref 0–199)
CHOLESTEROL/HDL RATIO: 3.6
CO2 SERPL-SCNC: 32 MMOL/L (ref 21–32)
CREAT SERPL-MCNC: 1.1 MG/DL (ref 0.5–1.3)
EGFRCR SERPLBLD CKD-EPI 2021: 75 ML/MIN/1.73M*2
EOSINOPHIL # BLD AUTO: 0.2 X10*3/UL (ref 0–0.7)
EOSINOPHIL NFR BLD AUTO: 1.9 %
ERYTHROCYTE [DISTWIDTH] IN BLOOD BY AUTOMATED COUNT: 14.1 % (ref 11.5–14.5)
EST. AVERAGE GLUCOSE BLD GHB EST-MCNC: 114 MG/DL
GLUCOSE SERPL-MCNC: 89 MG/DL (ref 74–99)
HBA1C MFR BLD: 5.6 %
HCT VFR BLD AUTO: 42.5 % (ref 41–52)
HDLC SERPL-MCNC: 35.5 MG/DL
HGB BLD-MCNC: 14 G/DL (ref 13.5–17.5)
IMM GRANULOCYTES # BLD AUTO: 0.04 X10*3/UL (ref 0–0.7)
IMM GRANULOCYTES NFR BLD AUTO: 0.4 % (ref 0–0.9)
LDLC SERPL CALC-MCNC: 64 MG/DL
LYMPHOCYTES # BLD AUTO: 2.17 X10*3/UL (ref 1.2–4.8)
LYMPHOCYTES NFR BLD AUTO: 20.3 %
MCH RBC QN AUTO: 28.8 PG (ref 26–34)
MCHC RBC AUTO-ENTMCNC: 32.9 G/DL (ref 32–36)
MCV RBC AUTO: 87 FL (ref 80–100)
MONOCYTES # BLD AUTO: 0.85 X10*3/UL (ref 0.1–1)
MONOCYTES NFR BLD AUTO: 7.9 %
NEUTROPHILS # BLD AUTO: 7.4 X10*3/UL (ref 1.2–7.7)
NEUTROPHILS NFR BLD AUTO: 69 %
NON HDL CHOLESTEROL: 92 MG/DL (ref 0–149)
NRBC BLD-RTO: 0 /100 WBCS (ref 0–0)
PLATELET # BLD AUTO: 286 X10*3/UL (ref 150–450)
POTASSIUM SERPL-SCNC: 4.5 MMOL/L (ref 3.5–5.3)
PROT SERPL-MCNC: 7.7 G/DL (ref 6.4–8.2)
RBC # BLD AUTO: 4.86 X10*6/UL (ref 4.5–5.9)
SODIUM SERPL-SCNC: 136 MMOL/L (ref 136–145)
TRIGL SERPL-MCNC: 139 MG/DL (ref 0–149)
VLDL: 28 MG/DL (ref 0–40)
WBC # BLD AUTO: 10.7 X10*3/UL (ref 4.4–11.3)

## 2024-01-24 PROCEDURE — 36415 COLL VENOUS BLD VENIPUNCTURE: CPT

## 2024-01-24 PROCEDURE — 83036 HEMOGLOBIN GLYCOSYLATED A1C: CPT

## 2024-01-24 PROCEDURE — 80061 LIPID PANEL: CPT

## 2024-01-24 PROCEDURE — 85025 COMPLETE CBC W/AUTO DIFF WBC: CPT

## 2024-01-24 PROCEDURE — 80053 COMPREHEN METABOLIC PANEL: CPT

## 2024-01-31 ENCOUNTER — OFFICE VISIT (OUTPATIENT)
Dept: DERMATOLOGY | Facility: CLINIC | Age: 64
End: 2024-01-31
Payer: MEDICARE

## 2024-01-31 DIAGNOSIS — L57.0 ACTINIC KERATOSIS: Primary | ICD-10-CM

## 2024-01-31 PROCEDURE — 3044F HG A1C LEVEL LT 7.0%: CPT | Performed by: NURSE PRACTITIONER

## 2024-01-31 PROCEDURE — 1036F TOBACCO NON-USER: CPT | Performed by: NURSE PRACTITIONER

## 2024-01-31 PROCEDURE — 99213 OFFICE O/P EST LOW 20 MIN: CPT | Performed by: NURSE PRACTITIONER

## 2024-01-31 PROCEDURE — 3048F LDL-C <100 MG/DL: CPT | Performed by: NURSE PRACTITIONER

## 2024-01-31 NOTE — PROGRESS NOTES
Subjective     Dakotah Orta is a 63 y.o. male who presents for the following: single lesion.     Established patient in for single lesion right scalp present for one month.     Review of Systems:  No other skin or systemic complaints other than what is documented elsewhere in the note.    The following portions of the chart were reviewed this encounter and updated as appropriate:       Skin Cancer History  No skin cancer on file.    Specialty Problems          Dermatology Problems    Actinic keratosis    Dermatographic urticaria    Hemangioma of skin and subcutaneous tissue    Melanocytic nevi of lower limb, including hip    Melanocytic nevi of other parts of face    Melanocytic nevi of unspecified upper limb, including shoulder    Other melanin hyperpigmentation    Other seborrheic keratosis    Rosacea, unspecified    Sebaceous cyst    Striae atrophicae     Past Medical History:  Dakotah Orta  has a past medical history of Abnormal weight loss (11/24/2014), Acute ethmoidal sinusitis, unspecified (06/15/2017), Acute frontal sinusitis, unspecified (05/09/2016), Age-related nuclear cataract, bilateral (07/24/2018), Body mass index (BMI)40.0-44.9, adult (05/24/2019), Cellulitis of abdominal wall (06/16/2017), Cellulitis of right upper limb (06/28/2016), Cellulitis, unspecified (07/24/2018), Cellulitis, unspecified (06/29/2017), Chronic ethmoidal sinusitis (07/24/2018), Chronic ethmoidal sinusitis (01/14/2020), Diseases of lips (05/09/2016), Disorder of lacrimal system, unspecified (07/24/2018), Disorder of scrotum (08/29/2023), Dyskinesia of esophagus (02/22/2019), Dysphagia, unspecified (07/24/2018), Dysphonia (06/15/2017), Encounter for immunization (09/04/2020), Encounter for screening for malignant neoplasm of prostate (02/16/2017), Encounter for screening for other viral diseases (08/28/2015), Epigastric pain (07/24/2018), Gastric ulcer, unspecified as acute or chronic, without hemorrhage or perforation  (07/18/2018), Generalized anxiety disorder (04/14/2020), Glossitis (06/08/2017), Hemorrhage of anus and rectum (10/30/2018), Hypersomnia, unspecified (02/04/2016), Hypertrophy of nasal turbinates (07/13/2017), Infectious mononucleosis, unspecified without complication (02/16/2017), Long term (current) use of non-steroidal anti-inflammatories (nsaid) (05/27/2015), Lower abdominal pain, unspecified (07/31/2018), Neoplasm of unspecified behavior of bone, soft tissue, and skin (08/08/2023), Osteoarthritis of knee, unspecified (04/27/2016), Other conditions influencing health status (07/24/2018), Other conditions influencing health status (02/26/2019), Other diseases of tongue (06/15/2017), Other forms of stomatitis (05/09/2016), Pain in unspecified ankle and joints of unspecified foot (10/20/2016), Personal history of other diseases of the digestive system (08/20/2019), Personal history of other diseases of the digestive system (07/18/2018), Personal history of other diseases of the digestive system (07/27/2017), Personal history of other diseases of the digestive system (02/22/2019), Personal history of other diseases of the digestive system (07/24/2018), Personal history of other diseases of the musculoskeletal system and connective tissue, Personal history of other diseases of the musculoskeletal system and connective tissue (03/26/2020), Personal history of other diseases of the musculoskeletal system and connective tissue, Personal history of other diseases of the nervous system and sense organs (07/02/2015), Personal history of other diseases of the nervous system and sense organs (02/16/2017), Personal history of other diseases of the nervous system and sense organs (10/20/2016), Personal history of other diseases of the respiratory system (11/24/2014), Personal history of other diseases of the respiratory system, Personal history of other diseases of the respiratory system (07/02/2019), Personal history of other  diseases of the respiratory system (12/02/2015), Personal history of other diseases of the respiratory system (02/04/2020), Personal history of other drug therapy (09/14/2017), Personal history of other endocrine, nutritional and metabolic disease, Personal history of other endocrine, nutritional and metabolic disease (06/16/2017), Personal history of other specified conditions (08/27/2019), Personal history of other specified conditions (07/24/2018), Personal history of other specified conditions (02/22/2019), Personal history of other specified conditions (06/15/2017), Personal history of other specified conditions (07/13/2017), Personal history of other specified conditions (04/17/2018), Personal history of other specified conditions (02/04/2020), Personal history of other specified conditions (04/14/2020), Personal history of other specified conditions (12/16/2020), Personal history of other specified conditions (11/14/2017), Personal history of other specified conditions, Personal history of other specified conditions (06/23/2015), Plantar fascial fibromatosis, Spondylosis without myelopathy or radiculopathy, cervical region (03/03/2020), Type 2 diabetes mellitus with mild nonproliferative diabetic retinopathy without macular edema, unspecified eye (CMS/HCC) (07/24/2018), Type 2 diabetes mellitus without complications (CMS/HCC) (07/24/2018), Unspecified acute noninfective otitis externa, bilateral (11/04/2015), Unspecified otitis externa, right ear (07/02/2019), Unspecified otitis externa, unspecified ear (09/18/2014), and Varicose veins of other specified sites.    Past Surgical History:  Dakotah Orta  has a past surgical history that includes Carpal tunnel release (03/12/2014); Other surgical history (07/12/2021); Sinus surgery (07/24/2018); Colonoscopy (07/12/2021); Tonsillectomy (12/10/2014); Total knee arthroplasty (05/08/2015); Other surgical history (12/13/2022); Other surgical history (12/13/2022);  Other surgical history (12/13/2022); Other surgical history (12/13/2022); Other surgical history (03/12/2014); Other surgical history (03/12/2014); and Rotator cuff repair (03/12/2014).    Family History:  Patient family history includes Breast cancer in his mother; COPD in his father and another family member; Colon cancer in his father.    Social History:  Dakotah Orta  reports that he has never smoked. He has never used smokeless tobacco. He reports that he does not drink alcohol and does not use drugs.    Allergies:  Patient has no known allergies.    Current Medications / CAM's:    Current Outpatient Medications:     albuterol 90 mcg/actuation inhaler, , Disp: , Rfl:     atorvastatin (Lipitor) 40 mg tablet, Take 1 tablet (40 mg) by mouth once daily at bedtime., Disp: , Rfl:     cholecalciferol (Vitamin D-3) 25 MCG (1000 UT) capsule, Take 0.5 capsules by mouth 2 times a day., Disp: , Rfl:     dextran 70-hypromellose drops, Administer 1 drop into both eyes if needed., Disp: , Rfl:     doxycycline (Periostat) 20 mg tablet, Take 1 tablet (20 mg) by mouth 2 times a day., Disp: , Rfl:     fluticasone (Flonase) 50 mcg/actuation nasal spray, Administer 1 spray into each nostril 2 times a day., Disp: , Rfl:     hydroCHLOROthiazide (HYDRODiuril) 25 mg tablet, Take 1 tablet (25 mg) by mouth once daily., Disp: , Rfl:     ibuprofen 800 mg tablet, Take 1 tablet (800 mg) by mouth every 8 hours if needed for mild pain (1 - 3)., Disp: , Rfl:     ketoconazole (NIZOral) 2 % cream, Apply topically once daily., Disp: , Rfl:     ketoconazole (NIZOral) 2 % shampoo, 1 Application, Disp: , Rfl:     lamoTRIgine (LaMICtal) 25 mg tablet, Take 1 tablet (25 mg) by mouth once daily., Disp: , Rfl:     lancets (Fingerstix Lancets) misc, Test twice daily, Disp: , Rfl:     lancets 30 gauge misc, Check fasting blood glucose daily, Disp: , Rfl:     metFORMIN XR (Glucophage-XR) 500 mg 24 hr tablet, Take 1 tablet (500 mg) by mouth 2 times a day.,  Disp: , Rfl:     metroNIDAZOLE (Metrogel) 0.75 % gel, APPLY 1 APPLICATION TWICE A DAY TOPICALLY TO AREAS ON FACE, Disp: 45 g, Rfl: 10    montelukast (Singulair) 10 mg tablet, Take 1 tablet (10 mg) by mouth once daily at bedtime., Disp: , Rfl:     nebivolol (Bystolic) 5 mg tablet, Take 1 tablet (5 mg) by mouth once daily., Disp: 90 tablet, Rfl: 3    NIFEdipine XL 30 mg 24 hr tablet, Take 1 tablet (30 mg) by mouth once daily., Disp: , Rfl:     omeprazole (PriLOSEC) 40 mg DR capsule, Take 1 capsule (40 mg) by mouth once daily., Disp: , Rfl:     OneTouch Ultra Test strip, Check fasting blood sugar once daily.  Record in log book., Disp: , Rfl:     polyethylene glycol (Miralax) 17 gram/dose powder, Take 17 g by mouth once daily., Disp: , Rfl:     tiZANidine (Zanaflex) 4 mg tablet, TAKE 1 TABLET TWICE DAILY AS NEEDED FOR  MUSCLE  SPASM, Disp: 180 tablet, Rfl: 10    trospium (Sanctura) 20 mg tablet, Take by mouth., Disp: , Rfl:     venlafaxine XR (Effexor-XR) 150 mg 24 hr capsule, Take 2 capsules (300 mg) by mouth once daily., Disp: , Rfl:     vitamin B complex tablet, Take 1 tablet by mouth once daily., Disp: , Rfl:      Objective   Well appearing patient in no apparent distress; mood and affect are within normal limits.    Assessment/Plan   1. Actinic keratosis  Right Forehead  Erythematous macules with gritty scale.    Destr of lesion - Right Forehead  Complexity: simple    Destruction method: cryotherapy    Informed consent: discussed and consent obtained    Lesion destroyed using liquid nitrogen: Yes    Cryotherapy cycles:  1  Outcome: patient tolerated procedure well with no complications    Post-procedure details: wound care instructions given

## 2024-03-04 DIAGNOSIS — L21.9 SEBORRHEIC DERMATITIS: Primary | ICD-10-CM

## 2024-03-04 DIAGNOSIS — L21.9 SEBORRHEIC DERMATITIS: ICD-10-CM

## 2024-03-04 RX ORDER — KETOCONAZOLE 20 MG/G
CREAM TOPICAL DAILY
Qty: 60 G | Refills: 2 | Status: SHIPPED | OUTPATIENT
Start: 2024-03-04

## 2024-03-08 RX ORDER — KETOCONAZOLE 20 MG/ML
SHAMPOO, SUSPENSION TOPICAL
Qty: 120 ML | Refills: 11 | Status: SHIPPED | OUTPATIENT
Start: 2024-03-08

## 2024-03-09 DIAGNOSIS — I10 BENIGN ESSENTIAL HYPERTENSION: Primary | ICD-10-CM

## 2024-03-09 DIAGNOSIS — J45.40 MODERATE PERSISTENT ASTHMA WITHOUT COMPLICATION (HHS-HCC): ICD-10-CM

## 2024-03-10 RX ORDER — HYDROCHLOROTHIAZIDE 25 MG/1
25 TABLET ORAL DAILY
Qty: 90 TABLET | Refills: 3 | Status: SHIPPED | OUTPATIENT
Start: 2024-03-10

## 2024-03-10 RX ORDER — MONTELUKAST SODIUM 10 MG/1
10 TABLET ORAL EVERY EVENING
Qty: 90 TABLET | Refills: 3 | Status: SHIPPED | OUTPATIENT
Start: 2024-03-10

## 2024-03-10 RX ORDER — NIFEDIPINE 30 MG/1
30 TABLET, EXTENDED RELEASE ORAL DAILY
Qty: 90 TABLET | Refills: 3 | Status: SHIPPED | OUTPATIENT
Start: 2024-03-10

## 2024-03-14 DIAGNOSIS — E78.2 MIXED HYPERLIPIDEMIA: Primary | ICD-10-CM

## 2024-03-14 RX ORDER — ATORVASTATIN CALCIUM 40 MG/1
40 TABLET, FILM COATED ORAL NIGHTLY
Qty: 90 TABLET | Refills: 3 | Status: SHIPPED | OUTPATIENT
Start: 2024-03-14

## 2024-04-08 ENCOUNTER — TELEPHONE (OUTPATIENT)
Dept: PRIMARY CARE | Facility: CLINIC | Age: 64
End: 2024-04-08
Payer: MEDICARE

## 2024-04-08 DIAGNOSIS — F32.5 DEPRESSION, MAJOR, IN REMISSION (CMS-HCC): Primary | ICD-10-CM

## 2024-04-08 RX ORDER — VENLAFAXINE HYDROCHLORIDE 150 MG/1
300 CAPSULE, EXTENDED RELEASE ORAL DAILY
Qty: 180 CAPSULE | Refills: 3 | Status: SHIPPED | OUTPATIENT
Start: 2024-04-08 | End: 2025-04-08

## 2024-04-08 NOTE — TELEPHONE ENCOUNTER
Pharmacy called to have us send in the refill for the venlafaxine 150mg       [facility] - [place], [place] - [address]  [address]  [facility] [address]  Phone: [phone] Fax: [phone]

## 2024-05-10 DIAGNOSIS — L71.9 ROSACEA: Primary | ICD-10-CM

## 2024-05-13 RX ORDER — DOXYCYCLINE HYCLATE 20 MG
TABLET ORAL
Qty: 60 TABLET | Refills: 11 | Status: SHIPPED | OUTPATIENT
Start: 2024-05-13

## 2024-05-23 DIAGNOSIS — K21.9 GERD WITHOUT ESOPHAGITIS: Primary | ICD-10-CM

## 2024-05-23 RX ORDER — OMEPRAZOLE 40 MG/1
40 CAPSULE, DELAYED RELEASE ORAL DAILY
Qty: 90 CAPSULE | Refills: 3 | Status: SHIPPED | OUTPATIENT
Start: 2024-05-23

## 2024-06-18 ENCOUNTER — APPOINTMENT (OUTPATIENT)
Dept: PRIMARY CARE | Facility: CLINIC | Age: 64
End: 2024-06-18
Payer: MEDICARE

## 2024-06-18 VITALS
HEIGHT: 60 IN | WEIGHT: 265.8 LBS | OXYGEN SATURATION: 98 % | TEMPERATURE: 97.2 F | DIASTOLIC BLOOD PRESSURE: 77 MMHG | SYSTOLIC BLOOD PRESSURE: 113 MMHG | BODY MASS INDEX: 52.18 KG/M2 | HEART RATE: 58 BPM

## 2024-06-18 DIAGNOSIS — E66.01 CLASS 3 SEVERE OBESITY DUE TO EXCESS CALORIES WITH SERIOUS COMORBIDITY AND BODY MASS INDEX (BMI) OF 50.0 TO 59.9 IN ADULT (MULTI): ICD-10-CM

## 2024-06-18 DIAGNOSIS — F33.2 MAJOR DEPRESSIVE DISORDER, RECURRENT SEVERE WITHOUT PSYCHOTIC FEATURES (MULTI): ICD-10-CM

## 2024-06-18 DIAGNOSIS — E11.65 TYPE 2 DIABETES MELLITUS WITH HYPERGLYCEMIA, WITHOUT LONG-TERM CURRENT USE OF INSULIN (MULTI): ICD-10-CM

## 2024-06-18 DIAGNOSIS — Z00.00 MEDICARE ANNUAL WELLNESS VISIT, SUBSEQUENT: Primary | ICD-10-CM

## 2024-06-18 DIAGNOSIS — Z12.5 PROSTATE CANCER SCREENING: ICD-10-CM

## 2024-06-18 DIAGNOSIS — Z12.11 ENCOUNTER FOR SCREENING FOR MALIGNANT NEOPLASM OF COLON: ICD-10-CM

## 2024-06-18 DIAGNOSIS — H93.19 TINNITUS, UNSPECIFIED LATERALITY: ICD-10-CM

## 2024-06-18 DIAGNOSIS — E78.2 MIXED HYPERLIPIDEMIA: ICD-10-CM

## 2024-06-18 DIAGNOSIS — I10 BENIGN ESSENTIAL HYPERTENSION: ICD-10-CM

## 2024-06-18 DIAGNOSIS — G47.33 OSA ON CPAP: ICD-10-CM

## 2024-06-18 DIAGNOSIS — M35.01 KERATITIS SICCA, BILATERAL (MULTI): ICD-10-CM

## 2024-06-18 PROBLEM — Z71.89 CARDIAC RISK COUNSELING: Status: ACTIVE | Noted: 2024-06-18

## 2024-06-18 PROBLEM — E66.813 CLASS 3 SEVERE OBESITY DUE TO EXCESS CALORIES WITH SERIOUS COMORBIDITY AND BODY MASS INDEX (BMI) OF 50.0 TO 59.9 IN ADULT: Status: ACTIVE | Noted: 2023-12-19

## 2024-06-18 PROCEDURE — 3074F SYST BP LT 130 MM HG: CPT | Performed by: INTERNAL MEDICINE

## 2024-06-18 PROCEDURE — 99214 OFFICE O/P EST MOD 30 MIN: CPT | Performed by: INTERNAL MEDICINE

## 2024-06-18 PROCEDURE — 1036F TOBACCO NON-USER: CPT | Performed by: INTERNAL MEDICINE

## 2024-06-18 PROCEDURE — 3078F DIAST BP <80 MM HG: CPT | Performed by: INTERNAL MEDICINE

## 2024-06-18 PROCEDURE — G0439 PPPS, SUBSEQ VISIT: HCPCS | Performed by: INTERNAL MEDICINE

## 2024-06-18 PROCEDURE — 3008F BODY MASS INDEX DOCD: CPT | Performed by: INTERNAL MEDICINE

## 2024-06-18 PROCEDURE — 3048F LDL-C <100 MG/DL: CPT | Performed by: INTERNAL MEDICINE

## 2024-06-18 PROCEDURE — 3044F HG A1C LEVEL LT 7.0%: CPT | Performed by: INTERNAL MEDICINE

## 2024-06-18 ASSESSMENT — ACTIVITIES OF DAILY LIVING (ADL)
GROCERY_SHOPPING: INDEPENDENT
DOING_HOUSEWORK: INDEPENDENT
BATHING: INDEPENDENT
DRESSING: INDEPENDENT
MANAGING_FINANCES: INDEPENDENT
TAKING_MEDICATION: INDEPENDENT

## 2024-06-18 ASSESSMENT — ENCOUNTER SYMPTOMS
CHILLS: 0
LIGHT-HEADEDNESS: 0
DIARRHEA: 0
DIZZINESS: 0
FATIGUE: 0
CONSTIPATION: 0
ARTHRALGIAS: 1
FEVER: 0
HEMATURIA: 0
NAUSEA: 0
DYSURIA: 0
PALPITATIONS: 0
DYSPHORIC MOOD: 0
BACK PAIN: 1
WHEEZING: 0
COUGH: 0
ABDOMINAL PAIN: 0
SHORTNESS OF BREATH: 0
ROS SKIN COMMENTS: DRY SKIN

## 2024-06-18 ASSESSMENT — PATIENT HEALTH QUESTIONNAIRE - PHQ9
SUM OF ALL RESPONSES TO PHQ9 QUESTIONS 1 AND 2: 0
1. LITTLE INTEREST OR PLEASURE IN DOING THINGS: NOT AT ALL
2. FEELING DOWN, DEPRESSED OR HOPELESS: NOT AT ALL

## 2024-06-18 NOTE — PROGRESS NOTES
CHIEF COMPLAINT  Medicare Annual Wellness Visit Subsequent    HISTORY OF PRESENT ILLNESS  Dakotah Orta is a 64 y.o. male presents today for follow up of Medicare Annual Wellness Visit Subsequent    HPI    Past Medical, Surgical, and Family History reviewed and updated in chart.  Reviewed all medications by prescribing practitioner or clinical pharmacist (such as prescriptions, OTCs, herbal therapies and supplements) and documented in the medical record.    Has some general stress, but does not feel depressed.  Has a lot of projects around the house, remodeling kitchen.    Watches many news programs, feels stressed regarding politics.  Has some tinnitus occasionally, wonders if it is due to any of his medications.  Will do fasting labs a different day.  Chronic dry eye, sees optometrist at least once per year.   Otherwise no new complaints.     REVIEW OF SYSTEMS  Review of Systems   Constitutional:  Negative for chills, fatigue and fever.   HENT:  Positive for tinnitus.    Eyes:         Dry eye   Respiratory:  Negative for cough, shortness of breath and wheezing.    Cardiovascular:  Negative for chest pain, palpitations and leg swelling.   Gastrointestinal:  Negative for abdominal pain, constipation, diarrhea and nausea.   Genitourinary:  Negative for dysuria and hematuria.   Musculoskeletal:  Positive for arthralgias and back pain.   Skin:         Dry skin    Neurological:  Negative for dizziness and light-headedness.   Psychiatric/Behavioral:  Negative for dysphoric mood.        ALLERGIES  Patient has no known allergies.    MEDICATIONS  Current Outpatient Medications   Medication Instructions    albuterol 90 mcg/actuation inhaler     atorvastatin (LIPITOR) 40 mg, oral, Nightly    cholecalciferol (Vitamin D-3) 25 MCG (1000 UT) capsule 0.5 capsules, oral, 2 times daily    dextran 70-hypromellose drops 1 drop, Both Eyes, As needed    doxycycline (Periostat) 20 mg tablet TAKE 1 TABLET TWICE A DAY BY MOUTH WITH FOOD. DO  NOT LAY DOWN IMMEDIATELY AFTER TAKING.    fluticasone (Flonase) 50 mcg/actuation nasal spray 1 spray, Each Nostril, 2 times daily    hydroCHLOROthiazide (HYDRODIURIL) 25 mg, oral, Daily    ibuprofen 800 mg, oral, Every 8 hours PRN    ketoconazole (NIZOral) 2 % cream Topical, Daily    ketoconazole (NIZOral) 2 % shampoo Apply to scalp 1-2 times weekly.  Increase, Decrease use as needed.  Allow to sit for 5 minutes before rinsing.    lamoTRIgine (LAMICTAL) 25 mg, oral, Daily    lancets (Fingerstix Lancets) misc miscellaneous, Test twice daily    metFORMIN XR (GLUCOPHAGE-XR) 500 mg, oral, 2 times daily    metroNIDAZOLE (Metrogel) 0.75 % gel APPLY 1 APPLICATION TWICE A DAY TOPICALLY TO AREAS ON FACE    montelukast (SINGULAIR) 10 mg, oral, Every evening    nebivolol (BYSTOLIC) 5 mg, oral, Daily    NIFEdipine ER (ADALAT CC) 30 mg, oral, Daily    omeprazole (PRILOSEC) 40 mg, oral, Daily    OneTouch Ultra Test strip Check fasting blood sugar once daily.  Record in log book.    polyethylene glycol (MIRALAX) 17 g, oral, Daily    venlafaxine XR (EFFEXOR-XR) 300 mg, oral, Daily    vitamin B complex tablet 1 tablet, oral, Daily       TOBACCO USE  Social History     Tobacco Use   Smoking Status Never   Smokeless Tobacco Never       DEPRESSION SCREEN  Over the past 2 weeks, how often have you been bothered by any of the following problems?  Little interest or pleasure in doing things: Not at all  Feeling down, depressed, or hopeless: Not at all    SURGICAL HISTORY  Past Surgical History:  03/12/2014: CARPAL TUNNEL RELEASE      Comment:  Neuroplasty Decompression Median Nerve At Carpal Tunnel  07/12/2021: COLONOSCOPY      Comment:  Colonoscopy (Fiberoptic)  07/12/2021: OTHER SURGICAL HISTORY      Comment:  Cervical surgery  12/13/2022: OTHER SURGICAL HISTORY      Comment:  Revision of hip arthroplasty  12/13/2022: OTHER SURGICAL HISTORY      Comment:  Lower back surgery  12/13/2022: OTHER SURGICAL HISTORY      Comment:  Ventral  hernia repair  12/13/2022: OTHER SURGICAL HISTORY      Comment:  Hip replacement  03/12/2014: OTHER SURGICAL HISTORY      Comment:  Knee Arthroscopy With Medial And Lateral Meniscectomy  03/12/2014: OTHER SURGICAL HISTORY      Comment:  Knee Arthroscopy With Abrasion Arthroplasty  03/12/2014: ROTATOR CUFF REPAIR      Comment:  Rotator Cuff Repair  07/24/2018: SINUS SURGERY      Comment:  Sinus Surgery  12/10/2014: TONSILLECTOMY      Comment:  Tonsillectomy  05/08/2015: TOTAL KNEE ARTHROPLASTY      Comment:  Knee Replacement       OBJECTIVE    /77 (BP Location: Right arm, Patient Position: Sitting, BP Cuff Size: Adult)   Pulse 58   Temp 36.2 °C (97.2 °F) (Temporal)   Ht 1.524 m (5')   Wt 121 kg (265 lb 12.8 oz)   SpO2 98%   BMI 51.91 kg/m²    BMI: Estimated body mass index is 51.91 kg/m² as calculated from the following:    Height as of this encounter: 1.524 m (5').    Weight as of this encounter: 121 kg (265 lb 12.8 oz).    BP Readings from Last 3 Encounters:   06/18/24 113/77   12/19/23 117/81   10/05/23 122/78      Wt Readings from Last 3 Encounters:   06/18/24 121 kg (265 lb 12.8 oz)   12/19/23 121 kg (267 lb 4.8 oz)   10/05/23 121 kg (266 lb 6.4 oz)        PHYSICAL EXAM  Physical Exam  Constitutional:       Appearance: Normal appearance.   HENT:      Head: Normocephalic and atraumatic.      Right Ear: Tympanic membrane and ear canal normal.      Left Ear: Tympanic membrane and ear canal normal.   Neck:      Vascular: No carotid bruit.   Cardiovascular:      Rate and Rhythm: Normal rate and regular rhythm.      Heart sounds: No murmur heard.  Pulmonary:      Effort: Pulmonary effort is normal. No respiratory distress.      Breath sounds: Normal breath sounds. No wheezing.   Abdominal:      General: There is no distension.      Palpations: Abdomen is soft.      Tenderness: There is no abdominal tenderness.   Musculoskeletal:      Right lower leg: No edema.      Left lower leg: No edema.   Skin:      Comments: Dry skin, arms  Dry cracked skin on hands   Neurological:      General: No focal deficit present.      Mental Status: He is alert and oriented to person, place, and time. Mental status is at baseline.   Psychiatric:         Mood and Affect: Mood normal.         Behavior: Behavior normal.         Thought Content: Thought content normal.         Judgment: Judgment normal.          ASSESSMENT AND PLAN  Assessment/Plan   Problem List Items Addressed This Visit       Benign essential hypertension    Relevant Orders    Comprehensive Metabolic Panel    CBC and Auto Differential    Hyperlipidemia    Relevant Orders    Lipid Panel    Keratitis sicca, bilateral (Multi)    Overview     Uses artificial tears  Follows with optometrist at least once per year         RACHEL on CPAP    Overview     6/18/24 - compliant with CPAP         Type 2 diabetes mellitus (Multi)    Relevant Orders    Hemoglobin A1c    Class 3 severe obesity due to excess calories with serious comorbidity and body mass index (BMI) of 50.0 to 59.9 in adult (Multi)    Medicare annual wellness visit, subsequent - Primary    Major depressive disorder, recurrent severe without psychotic features (Multi)    Overview     Stable on current medications         Tinnitus    Prostate cancer screening    Relevant Orders    Prostate Spec.Ag,Screen    Encounter for screening for malignant neoplasm of colon    Relevant Orders    Colonoscopy Screening; Average Risk Patient       Medicare Wellness Visit completed.     Hypertension - controlled, continue current medication, check metabolic panel.     DM2 - Check A1c.   Healthy habits recommended.  Aim for 30 minutes of aerobic exercise, 5 times per week.  Try to cut back on processed foods, including foods made with flour, sugar, added salt, and saturated fat.      Mixed hyperlipidemia - on statin, check lipid panel.     Depression - stable, continue current medications.    Tinnitus - I reviewed side effects of most of  his medications.  Venlafaxine can possibly cause tinnitus.  I do not recommend that he stop at this time, however if he were to, would need to taper.      Prostate cancer screening - follows with urologist, due for appointment as of June 2024.  PSA 12/13/2022, ordered for 2024.     Colonoscopy 8/6/2018, due 2023.  Reminded him to schedule June 2023, new colonoscopy order placed June 2024.       Reviewed signs of skin cancer and importance of sun protection.  Follows with dermatologist annually.      Continue to stay current with routine dental and eye exams.     Flu shot & COVID booster recommended annually in the Fall.      Follow-up in 6 months.

## 2024-06-21 ENCOUNTER — LAB (OUTPATIENT)
Dept: LAB | Facility: LAB | Age: 64
End: 2024-06-21
Payer: MEDICARE

## 2024-06-21 DIAGNOSIS — E78.2 MIXED HYPERLIPIDEMIA: ICD-10-CM

## 2024-06-21 DIAGNOSIS — I10 BENIGN ESSENTIAL HYPERTENSION: ICD-10-CM

## 2024-06-21 DIAGNOSIS — E11.65 TYPE 2 DIABETES MELLITUS WITH HYPERGLYCEMIA, WITHOUT LONG-TERM CURRENT USE OF INSULIN (MULTI): ICD-10-CM

## 2024-06-21 DIAGNOSIS — Z12.5 PROSTATE CANCER SCREENING: ICD-10-CM

## 2024-06-21 LAB
ALBUMIN SERPL BCP-MCNC: 4.4 G/DL (ref 3.4–5)
ALP SERPL-CCNC: 100 U/L (ref 33–136)
ALT SERPL W P-5'-P-CCNC: 19 U/L (ref 10–52)
ANION GAP SERPL CALC-SCNC: 10 MMOL/L (ref 10–20)
AST SERPL W P-5'-P-CCNC: 24 U/L (ref 9–39)
BASOPHILS # BLD AUTO: 0.07 X10*3/UL (ref 0–0.1)
BASOPHILS NFR BLD AUTO: 0.8 %
BILIRUB SERPL-MCNC: 0.6 MG/DL (ref 0–1.2)
BUN SERPL-MCNC: 16 MG/DL (ref 6–23)
CALCIUM SERPL-MCNC: 9.6 MG/DL (ref 8.6–10.3)
CHLORIDE SERPL-SCNC: 100 MMOL/L (ref 98–107)
CHOLEST SERPL-MCNC: 131 MG/DL (ref 0–199)
CHOLESTEROL/HDL RATIO: 3.6
CO2 SERPL-SCNC: 32 MMOL/L (ref 21–32)
CREAT SERPL-MCNC: 0.98 MG/DL (ref 0.5–1.3)
EGFRCR SERPLBLD CKD-EPI 2021: 86 ML/MIN/1.73M*2
EOSINOPHIL # BLD AUTO: 0.2 X10*3/UL (ref 0–0.7)
EOSINOPHIL NFR BLD AUTO: 2.4 %
ERYTHROCYTE [DISTWIDTH] IN BLOOD BY AUTOMATED COUNT: 13.5 % (ref 11.5–14.5)
EST. AVERAGE GLUCOSE BLD GHB EST-MCNC: 120 MG/DL
GLUCOSE SERPL-MCNC: 106 MG/DL (ref 74–99)
HBA1C MFR BLD: 5.8 %
HCT VFR BLD AUTO: 42.6 % (ref 41–52)
HDLC SERPL-MCNC: 36 MG/DL
HGB BLD-MCNC: 13.8 G/DL (ref 13.5–17.5)
IMM GRANULOCYTES # BLD AUTO: 0.03 X10*3/UL (ref 0–0.7)
IMM GRANULOCYTES NFR BLD AUTO: 0.4 % (ref 0–0.9)
LDLC SERPL CALC-MCNC: 65 MG/DL
LYMPHOCYTES # BLD AUTO: 1.73 X10*3/UL (ref 1.2–4.8)
LYMPHOCYTES NFR BLD AUTO: 20.9 %
MCH RBC QN AUTO: 29 PG (ref 26–34)
MCHC RBC AUTO-ENTMCNC: 32.4 G/DL (ref 32–36)
MCV RBC AUTO: 90 FL (ref 80–100)
MONOCYTES # BLD AUTO: 0.59 X10*3/UL (ref 0.1–1)
MONOCYTES NFR BLD AUTO: 7.1 %
NEUTROPHILS # BLD AUTO: 5.66 X10*3/UL (ref 1.2–7.7)
NEUTROPHILS NFR BLD AUTO: 68.4 %
NON HDL CHOLESTEROL: 95 MG/DL (ref 0–149)
NRBC BLD-RTO: 0 /100 WBCS (ref 0–0)
PLATELET # BLD AUTO: 252 X10*3/UL (ref 150–450)
POTASSIUM SERPL-SCNC: 3.8 MMOL/L (ref 3.5–5.3)
PROT SERPL-MCNC: 7 G/DL (ref 6.4–8.2)
PSA SERPL-MCNC: 2.03 NG/ML
RBC # BLD AUTO: 4.76 X10*6/UL (ref 4.5–5.9)
SODIUM SERPL-SCNC: 138 MMOL/L (ref 136–145)
TRIGL SERPL-MCNC: 150 MG/DL (ref 0–149)
VLDL: 30 MG/DL (ref 0–40)
WBC # BLD AUTO: 8.3 X10*3/UL (ref 4.4–11.3)

## 2024-06-21 PROCEDURE — 80061 LIPID PANEL: CPT

## 2024-06-21 PROCEDURE — 85025 COMPLETE CBC W/AUTO DIFF WBC: CPT

## 2024-06-21 PROCEDURE — 36415 COLL VENOUS BLD VENIPUNCTURE: CPT

## 2024-06-21 PROCEDURE — 80053 COMPREHEN METABOLIC PANEL: CPT

## 2024-06-21 PROCEDURE — G0103 PSA SCREENING: HCPCS

## 2024-06-21 PROCEDURE — 83036 HEMOGLOBIN GLYCOSYLATED A1C: CPT

## 2024-07-01 ENCOUNTER — APPOINTMENT (OUTPATIENT)
Dept: PRIMARY CARE | Facility: CLINIC | Age: 64
End: 2024-07-01
Payer: MEDICARE

## 2024-07-08 ENCOUNTER — TELEPHONE (OUTPATIENT)
Dept: PRIMARY CARE | Facility: CLINIC | Age: 64
End: 2024-07-08
Payer: MEDICARE

## 2024-07-08 DIAGNOSIS — F33.2 MAJOR DEPRESSIVE DISORDER, RECURRENT SEVERE WITHOUT PSYCHOTIC FEATURES (MULTI): Primary | ICD-10-CM

## 2024-07-08 RX ORDER — LAMOTRIGINE 25 MG/1
25 TABLET ORAL DAILY
Qty: 90 TABLET | Refills: 3 | Status: SHIPPED | OUTPATIENT
Start: 2024-07-08 | End: 2025-07-08

## 2024-09-12 DIAGNOSIS — E11.65 TYPE 2 DIABETES MELLITUS WITH HYPERGLYCEMIA, WITHOUT LONG-TERM CURRENT USE OF INSULIN (MULTI): Primary | ICD-10-CM

## 2024-09-12 RX ORDER — METFORMIN HYDROCHLORIDE 500 MG/1
500 TABLET, EXTENDED RELEASE ORAL 2 TIMES DAILY
Qty: 180 TABLET | Refills: 3 | Status: SHIPPED | OUTPATIENT
Start: 2024-09-12

## 2024-10-17 ENCOUNTER — APPOINTMENT (OUTPATIENT)
Dept: DERMATOLOGY | Facility: CLINIC | Age: 64
End: 2024-10-17
Payer: MEDICARE

## 2024-10-17 DIAGNOSIS — L73.8 SEBACEOUS HYPERPLASIA OF FACE: ICD-10-CM

## 2024-10-17 DIAGNOSIS — L82.1 SEBORRHEIC KERATOSIS: ICD-10-CM

## 2024-10-17 DIAGNOSIS — D22.9 NEVUS: Primary | ICD-10-CM

## 2024-10-17 DIAGNOSIS — L81.4 LENTIGO: ICD-10-CM

## 2024-10-17 DIAGNOSIS — B07.9 VIRAL WARTS, UNSPECIFIED TYPE: ICD-10-CM

## 2024-10-17 DIAGNOSIS — L71.9 ROSACEA: ICD-10-CM

## 2024-10-17 DIAGNOSIS — L21.9 SEBORRHEIC DERMATITIS: ICD-10-CM

## 2024-10-17 DIAGNOSIS — D18.01 CHERRY ANGIOMA: ICD-10-CM

## 2024-10-17 PROCEDURE — 99214 OFFICE O/P EST MOD 30 MIN: CPT | Performed by: NURSE PRACTITIONER

## 2024-10-17 PROCEDURE — 3048F LDL-C <100 MG/DL: CPT | Performed by: NURSE PRACTITIONER

## 2024-10-17 PROCEDURE — 3044F HG A1C LEVEL LT 7.0%: CPT | Performed by: NURSE PRACTITIONER

## 2024-10-17 PROCEDURE — 1036F TOBACCO NON-USER: CPT | Performed by: NURSE PRACTITIONER

## 2024-10-17 RX ORDER — DOXYCYCLINE HYCLATE 20 MG
20 TABLET ORAL 2 TIMES DAILY
Qty: 60 TABLET | Refills: 11 | Status: SHIPPED | OUTPATIENT
Start: 2024-10-17

## 2024-10-17 NOTE — PROGRESS NOTES
Subjective     Dakotah Orta is a 64 y.o. male who presents for the following: Skin Check.   Established patient in for full body skin exam.    Review of Systems:  No other skin or systemic complaints other than what is documented elsewhere in the note.    The following portions of the chart were reviewed this encounter and updated as appropriate:       Skin Cancer History  No skin cancer on file.    Specialty Problems          Dermatology Problems    Actinic keratosis    Dermatographic urticaria    Hemangioma of skin and subcutaneous tissue    Melanocytic nevi of lower limb, including hip    Melanocytic nevi of other parts of face    Melanocytic nevi of unspecified upper limb, including shoulder    Other melanin hyperpigmentation    Other seborrheic keratosis    Rosacea, unspecified    Sebaceous cyst    Striae atrophicae     Past Medical History:  Dakotah Orta  has a past medical history of Abnormal weight loss (11/24/2014), Acute ethmoidal sinusitis, unspecified (06/15/2017), Acute frontal sinusitis, unspecified (05/09/2016), Age-related nuclear cataract, bilateral (07/24/2018), Body mass index (BMI)40.0-44.9, adult (05/24/2019), Cellulitis of abdominal wall (06/16/2017), Cellulitis of right upper limb (06/28/2016), Cellulitis, unspecified (07/24/2018), Cellulitis, unspecified (06/29/2017), Chronic ethmoidal sinusitis (07/24/2018), Chronic ethmoidal sinusitis (01/14/2020), Diseases of lips (05/09/2016), Disorder of lacrimal system, unspecified (07/24/2018), Disorder of scrotum (08/29/2023), Dyskinesia of esophagus (02/22/2019), Dysphagia, unspecified (07/24/2018), Dysphonia (06/15/2017), Encounter for immunization (09/04/2020), Encounter for screening for malignant neoplasm of prostate (02/16/2017), Encounter for screening for other viral diseases (08/28/2015), Epigastric pain (07/24/2018), Gastric ulcer, unspecified as acute or chronic, without hemorrhage or perforation (07/18/2018), Generalized anxiety disorder  (04/14/2020), Glossitis (06/08/2017), Hemorrhage of anus and rectum (10/30/2018), Hypersomnia, unspecified (02/04/2016), Hypertrophy of nasal turbinates (07/13/2017), Infectious mononucleosis, unspecified without complication (02/16/2017), Long term (current) use of non-steroidal anti-inflammatories (nsaid) (05/27/2015), Lower abdominal pain, unspecified (07/31/2018), Neoplasm of unspecified behavior of bone, soft tissue, and skin (08/08/2023), Osteoarthritis of knee, unspecified (04/27/2016), Other conditions influencing health status (07/24/2018), Other conditions influencing health status (02/26/2019), Other diseases of tongue (06/15/2017), Other forms of stomatitis (05/09/2016), Pain in unspecified ankle and joints of unspecified foot (10/20/2016), Personal history of other diseases of the digestive system (08/20/2019), Personal history of other diseases of the digestive system (07/18/2018), Personal history of other diseases of the digestive system (07/27/2017), Personal history of other diseases of the digestive system (02/22/2019), Personal history of other diseases of the digestive system (07/24/2018), Personal history of other diseases of the musculoskeletal system and connective tissue, Personal history of other diseases of the musculoskeletal system and connective tissue (03/26/2020), Personal history of other diseases of the musculoskeletal system and connective tissue, Personal history of other diseases of the nervous system and sense organs (07/02/2015), Personal history of other diseases of the nervous system and sense organs (02/16/2017), Personal history of other diseases of the nervous system and sense organs (10/20/2016), Personal history of other diseases of the respiratory system (11/24/2014), Personal history of other diseases of the respiratory system, Personal history of other diseases of the respiratory system (07/02/2019), Personal history of other diseases of the respiratory system  (12/02/2015), Personal history of other diseases of the respiratory system (02/04/2020), Personal history of other drug therapy (09/14/2017), Personal history of other endocrine, nutritional and metabolic disease, Personal history of other endocrine, nutritional and metabolic disease (06/16/2017), Personal history of other specified conditions (08/27/2019), Personal history of other specified conditions (07/24/2018), Personal history of other specified conditions (02/22/2019), Personal history of other specified conditions (06/15/2017), Personal history of other specified conditions (07/13/2017), Personal history of other specified conditions (04/17/2018), Personal history of other specified conditions (02/04/2020), Personal history of other specified conditions (04/14/2020), Personal history of other specified conditions (12/16/2020), Personal history of other specified conditions (11/14/2017), Personal history of other specified conditions, Personal history of other specified conditions (06/23/2015), Plantar fascial fibromatosis, Spondylosis without myelopathy or radiculopathy, cervical region (03/03/2020), Type 2 diabetes mellitus with mild nonproliferative diabetic retinopathy without macular edema, unspecified eye (07/24/2018), Type 2 diabetes mellitus without complications (Multi) (07/24/2018), Unspecified acute noninfective otitis externa, bilateral (11/04/2015), Unspecified otitis externa, right ear (07/02/2019), Unspecified otitis externa, unspecified ear (09/18/2014), and Varicose veins of other specified sites.    Past Surgical History:  Dakotah Orta  has a past surgical history that includes Carpal tunnel release (03/12/2014); Other surgical history (07/12/2021); Sinus surgery (07/24/2018); Colonoscopy (07/12/2021); Tonsillectomy (12/10/2014); Total knee arthroplasty (05/08/2015); Other surgical history (12/13/2022); Other surgical history (12/13/2022); Other surgical history (12/13/2022); Other surgical  history (12/13/2022); Other surgical history (03/12/2014); Other surgical history (03/12/2014); and Rotator cuff repair (03/12/2014).    Family History:  Patient family history includes Breast cancer in his mother; COPD in his father and another family member; Colon cancer in his father.    Social History:  Dakotah Orta  reports that he has never smoked. He has never used smokeless tobacco. He reports that he does not currently use alcohol. He reports that he does not use drugs.    Allergies:  Patient has no known allergies.    Current Medications / CAM's:    Current Outpatient Medications:     albuterol 90 mcg/actuation inhaler, , Disp: , Rfl:     atorvastatin (Lipitor) 40 mg tablet, TAKE 1 TABLET AT BEDTIME, Disp: 90 tablet, Rfl: 3    cholecalciferol (Vitamin D-3) 25 MCG (1000 UT) capsule, Take 0.5 capsules by mouth 2 times a day., Disp: , Rfl:     dextran 70-hypromellose drops, Administer 1 drop into both eyes if needed., Disp: , Rfl:     doxycycline (Periostat) 20 mg tablet, Take 1 tablet (20 mg) by mouth 2 times a day. Take with a full glass of water and do not lie down for at least 30 minutes after., Disp: 60 tablet, Rfl: 11    fluticasone (Flonase) 50 mcg/actuation nasal spray, Administer 1 spray into each nostril 2 times a day., Disp: , Rfl:     hydroCHLOROthiazide (HYDRODiuril) 25 mg tablet, TAKE 1 TABLET EVERY DAY, Disp: 90 tablet, Rfl: 3    ibuprofen 800 mg tablet, Take 1 tablet (800 mg) by mouth every 8 hours if needed for mild pain (1 - 3)., Disp: , Rfl:     ketoconazole (NIZOral) 2 % cream, Apply topically once daily., Disp: 60 g, Rfl: 2    ketoconazole (NIZOral) 2 % shampoo, Apply to scalp 1-2 times weekly.  Increase, Decrease use as needed.  Allow to sit for 5 minutes before rinsing., Disp: 120 mL, Rfl: 11    lamoTRIgine (LaMICtal) 25 mg tablet, Take 1 tablet (25 mg) by mouth once daily., Disp: 90 tablet, Rfl: 3    lancets (Fingerstix Lancets) misc, Test twice daily, Disp: , Rfl:     metFORMIN   "mg 24 hr tablet, TAKE 1 TABLET TWICE DAILY, Disp: 180 tablet, Rfl: 3    metroNIDAZOLE (Metrogel) 0.75 % gel, APPLY 1 APPLICATION TWICE A DAY TOPICALLY TO AREAS ON FACE, Disp: 45 g, Rfl: 10    montelukast (Singulair) 10 mg tablet, TAKE 1 TABLET IN THE EVENING, Disp: 90 tablet, Rfl: 3    nebivolol (Bystolic) 5 mg tablet, Take 1 tablet (5 mg) by mouth once daily., Disp: 90 tablet, Rfl: 3    NIFEdipine XL 30 mg 24 hr tablet, TAKE 1 TABLET EVERY DAY, Disp: 90 tablet, Rfl: 3    omeprazole (PriLOSEC) 40 mg DR capsule, TAKE 1 CAPSULE EVERY DAY, Disp: 90 capsule, Rfl: 3    OneTouch Ultra Test strip, Check fasting blood sugar once daily.  Record in log book., Disp: , Rfl:     polyethylene glycol (Miralax) 17 gram/dose powder, Take 17 g by mouth once daily., Disp: , Rfl:     venlafaxine XR (Effexor-XR) 150 mg 24 hr capsule, Take 2 capsules (300 mg) by mouth once daily., Disp: 180 capsule, Rfl: 3    vitamin B complex tablet, Take 1 tablet by mouth once daily., Disp: , Rfl:      Objective   Well appearing patient in no apparent distress; mood and affect are within normal limits.      Assessment/Plan   1. Nevus  Uniform pigmented macule(s)/papule(s) with reassuring findings on dermoscopy    -Discussed nature of condition  -Reassurance, benign-appearing features on examination today  -Recommend continued observation    2. Lentigo  Tan macules    -Benign appearing on exam  -Reassurance, recommend observation    3. Cherry angioma  Cherry red papules    -Discussed nature of condition  -Reassurance, recommend continued observation    4. Seborrheic keratosis  Stuck on, waxy macule(s)/papule(s)/plaque(s) with comedo-like openings and milia like cysts    -Discussed nature of condition  -Reassurance, recommend continued observation    5. Seborrheic dermatitis  Scalp  Erythematous macule(s)/patch(es) with greasy scale    -Discussed the nature of the diagnosis  -There is no \"cure\" for this condition. Treatments will need to be continued " long term to treat the condition  -Recommend:  Can continue ketoconazole shampoo and cream 2% as needed    Related Medications  ketoconazole (NIZOral) 2 % shampoo  Apply to scalp 1-2 times weekly.  Increase, Decrease use as needed.  Allow to sit for 5 minutes before rinsing.    ketoconazole (NIZOral) 2 % cream  Apply topically once daily.    6. Sebaceous hyperplasia of face (3)  Left Forehead, Mid Forehead, Right Forehead  Yellow papule(s) with central dell and crown of vessels    -Discussed nature of condition  -Reassurance, recommend continued observation    7. Rosacea  Head - Anterior (Face)  Erythema and telangiectasias    -Discussed nature of this chronic condition  -Recommend gentle skin care habits including gentle cleansers, non-comedogenic physical/mineral based sunscreen daily. Avoid exfoliation, wind and extreme temperatures when possible.    Related Medications  metroNIDAZOLE (Metrogel) 0.75 % gel  APPLY 1 APPLICATION TWICE A DAY TOPICALLY TO AREAS ON FACE    doxycycline (Periostat) 20 mg tablet  Take 1 tablet (20 mg) by mouth 2 times a day. Take with a full glass of water and do not lie down for at least 30 minutes after.    8. Viral warts, unspecified type  Right Palmar Middle 2nd Finger  Verrucous papule(s)/plaque(s)    -Discussed nature of condition  -Multiple treatments in office are often needed  -Diligent at home therapy is often needed  -Cryotherapy today  -Possible side effects of liquid nitrogen treatment reviewed including formation of blisters, crusting, tenderness, scar, and discoloration which may be permanent.  -Patient advised to return the office for re-evaluation if the treated lesion(s) do not resolve within 4-6 weeks. Patient verbalizes understanding.    Destr of lesion - Right Palmar Middle 2nd Finger  Complexity: simple    Destruction method: cryotherapy    Informed consent: discussed and consent obtained    Lesion destroyed using liquid nitrogen: Yes    Outcome: patient tolerated  procedure well with no complications    Post-procedure details: wound care instructions given

## 2024-10-29 DIAGNOSIS — I10 BENIGN ESSENTIAL HYPERTENSION: ICD-10-CM

## 2024-10-29 RX ORDER — NEBIVOLOL 5 MG/1
5 TABLET ORAL DAILY
Qty: 90 TABLET | Refills: 3 | Status: SHIPPED | OUTPATIENT
Start: 2024-10-29

## 2024-11-06 ENCOUNTER — TELEPHONE (OUTPATIENT)
Dept: PRIMARY CARE | Facility: CLINIC | Age: 64
End: 2024-11-06
Payer: MEDICARE

## 2024-11-06 DIAGNOSIS — M54.41 CHRONIC RIGHT-SIDED LOW BACK PAIN WITH RIGHT-SIDED SCIATICA: Primary | ICD-10-CM

## 2024-11-06 DIAGNOSIS — G89.29 CHRONIC RIGHT-SIDED LOW BACK PAIN WITH RIGHT-SIDED SCIATICA: Primary | ICD-10-CM

## 2024-11-06 DIAGNOSIS — M48.062 SPINAL STENOSIS OF LUMBAR REGION WITH NEUROGENIC CLAUDICATION: ICD-10-CM

## 2024-12-18 ENCOUNTER — HOSPITAL ENCOUNTER (OUTPATIENT)
Dept: RADIOLOGY | Facility: CLINIC | Age: 64
Discharge: HOME | End: 2024-12-18
Payer: MEDICARE

## 2024-12-18 ENCOUNTER — APPOINTMENT (OUTPATIENT)
Dept: PRIMARY CARE | Facility: CLINIC | Age: 64
End: 2024-12-18
Payer: MEDICARE

## 2024-12-18 VITALS
WEIGHT: 275.3 LBS | TEMPERATURE: 96.8 F | SYSTOLIC BLOOD PRESSURE: 115 MMHG | BODY MASS INDEX: 54.05 KG/M2 | HEART RATE: 55 BPM | HEIGHT: 60 IN | DIASTOLIC BLOOD PRESSURE: 75 MMHG

## 2024-12-18 DIAGNOSIS — R05.3 CHRONIC COUGH: ICD-10-CM

## 2024-12-18 DIAGNOSIS — R07.89 OTHER CHEST PAIN: ICD-10-CM

## 2024-12-18 DIAGNOSIS — E78.2 MIXED HYPERLIPIDEMIA: ICD-10-CM

## 2024-12-18 DIAGNOSIS — F33.2 MAJOR DEPRESSIVE DISORDER, RECURRENT SEVERE WITHOUT PSYCHOTIC FEATURES (MULTI): ICD-10-CM

## 2024-12-18 DIAGNOSIS — J45.909 ASTHMA, UNSPECIFIED ASTHMA SEVERITY, UNSPECIFIED WHETHER COMPLICATED, UNSPECIFIED WHETHER PERSISTENT (HHS-HCC): ICD-10-CM

## 2024-12-18 DIAGNOSIS — E11.65 TYPE 2 DIABETES MELLITUS WITH HYPERGLYCEMIA, WITHOUT LONG-TERM CURRENT USE OF INSULIN: ICD-10-CM

## 2024-12-18 DIAGNOSIS — I10 BENIGN ESSENTIAL HYPERTENSION: Primary | ICD-10-CM

## 2024-12-18 PROCEDURE — 3044F HG A1C LEVEL LT 7.0%: CPT | Performed by: INTERNAL MEDICINE

## 2024-12-18 PROCEDURE — 3008F BODY MASS INDEX DOCD: CPT | Performed by: INTERNAL MEDICINE

## 2024-12-18 PROCEDURE — 83036 HEMOGLOBIN GLYCOSYLATED A1C: CPT

## 2024-12-18 PROCEDURE — 99214 OFFICE O/P EST MOD 30 MIN: CPT | Performed by: INTERNAL MEDICINE

## 2024-12-18 PROCEDURE — 3074F SYST BP LT 130 MM HG: CPT | Performed by: INTERNAL MEDICINE

## 2024-12-18 PROCEDURE — 3048F LDL-C <100 MG/DL: CPT | Performed by: INTERNAL MEDICINE

## 2024-12-18 PROCEDURE — 3078F DIAST BP <80 MM HG: CPT | Performed by: INTERNAL MEDICINE

## 2024-12-18 PROCEDURE — 71046 X-RAY EXAM CHEST 2 VIEWS: CPT

## 2024-12-18 PROCEDURE — 80053 COMPREHEN METABOLIC PANEL: CPT

## 2024-12-18 PROCEDURE — 71046 X-RAY EXAM CHEST 2 VIEWS: CPT | Performed by: RADIOLOGY

## 2024-12-18 PROCEDURE — 80061 LIPID PANEL: CPT

## 2024-12-18 PROCEDURE — 85025 COMPLETE CBC W/AUTO DIFF WBC: CPT

## 2024-12-18 PROCEDURE — 1036F TOBACCO NON-USER: CPT | Performed by: INTERNAL MEDICINE

## 2024-12-18 ASSESSMENT — ENCOUNTER SYMPTOMS
BACK PAIN: 1
ARTHRALGIAS: 1
WHEEZING: 0
COUGH: 0
DYSPHORIC MOOD: 0
CHILLS: 0
LIGHT-HEADEDNESS: 0
DYSURIA: 0
FEVER: 0
CONSTIPATION: 0
NAUSEA: 0
DIARRHEA: 0
ABDOMINAL PAIN: 0
SHORTNESS OF BREATH: 0
FATIGUE: 0
PALPITATIONS: 0
HEMATURIA: 0
DIZZINESS: 0

## 2024-12-18 NOTE — PROGRESS NOTES
CHIEF COMPLAINT  Diabetes    HISTORY OF PRESENT ILLNESS  Dakotah Orta is a 64 y.o. male presents today for follow up of Diabetes    HPI    Has been eating more sweets lately, cookies.  Gained 10 lbs since June.  Stable on current meds.  Fasting for routine labs.  He did not schedule colonoscopy yet, thinking of doing it at [facility].    Occasional pain in left lower chest / lower rib area.  This is relieved by using his inhaler.  No history of smoking, however was exposed to metals as a .     REVIEW OF SYSTEMS  Review of Systems   Constitutional:  Negative for chills, fatigue and fever.   HENT:  Positive for tinnitus.    Respiratory:  Negative for cough, shortness of breath and wheezing.    Cardiovascular:  Negative for chest pain, palpitations and leg swelling.   Gastrointestinal:  Negative for abdominal pain, constipation, diarrhea and nausea.   Genitourinary:  Negative for dysuria and hematuria.   Musculoskeletal:  Positive for arthralgias and back pain.   Neurological:  Negative for dizziness and light-headedness.   Psychiatric/Behavioral:  Negative for dysphoric mood.        ALLERGIES  Patient has no known allergies.    MEDICATIONS  Current Outpatient Medications   Medication Instructions    albuterol 90 mcg/actuation inhaler     atorvastatin (LIPITOR) 40 mg, oral, Nightly    cholecalciferol (Vitamin D-3) 25 MCG (1000 UT) capsule 0.5 capsules, 2 times daily    dextran 70-hypromellose drops 1 drop, As needed    doxycycline (PERIOSTAT) 20 mg, oral, 2 times daily, Take with a full glass of water and do not lie down for at least 30 minutes after.    fluticasone (Flonase) 50 mcg/actuation nasal spray 1 spray, 2 times daily    hydroCHLOROthiazide (HYDRODIURIL) 25 mg, oral, Daily    ibuprofen 800 mg, Every 8 hours PRN    ketoconazole (NIZOral) 2 % cream Topical, Daily    ketoconazole (NIZOral) 2 % shampoo Apply to scalp 1-2 times weekly.  Increase, Decrease use as needed.  Allow to sit for 5 minutes before rinsing.     lamoTRIgine (LAMICTAL) 25 mg, oral, Daily    lancets (Fingerstix Lancets) misc Test twice daily    metFORMIN XR (GLUCOPHAGE-XR) 500 mg, oral, 2 times daily    metroNIDAZOLE (Metrogel) 0.75 % gel APPLY 1 APPLICATION TWICE A DAY TOPICALLY TO AREAS ON FACE    montelukast (SINGULAIR) 10 mg, oral, Every evening    nebivolol (BYSTOLIC) 5 mg, oral, Daily    NIFEdipine ER (ADALAT CC) 30 mg, oral, Daily    omeprazole (PRILOSEC) 40 mg, oral, Daily    OneTouch Ultra Test strip Check fasting blood sugar once daily.  Record in log book.    polyethylene glycol (MIRALAX) 17 g, Daily    venlafaxine XR (EFFEXOR-XR) 300 mg, oral, Daily    vitamin B complex tablet 1 tablet, Daily       TOBACCO USE  Social History     Tobacco Use   Smoking Status Never   Smokeless Tobacco Never     SURGICAL HISTORY  Past Surgical History:  03/12/2014: CARPAL TUNNEL RELEASE      Comment:  Neuroplasty Decompression Median Nerve At Carpal Tunnel  07/12/2021: COLONOSCOPY      Comment:  Colonoscopy (Fiberoptic)  07/12/2021: OTHER SURGICAL HISTORY      Comment:  Cervical surgery  12/13/2022: OTHER SURGICAL HISTORY      Comment:  Revision of hip arthroplasty  12/13/2022: OTHER SURGICAL HISTORY      Comment:  Lower back surgery  12/13/2022: OTHER SURGICAL HISTORY      Comment:  Ventral hernia repair  12/13/2022: OTHER SURGICAL HISTORY      Comment:  Hip replacement  03/12/2014: OTHER SURGICAL HISTORY      Comment:  Knee Arthroscopy With Medial And Lateral Meniscectomy  03/12/2014: OTHER SURGICAL HISTORY      Comment:  Knee Arthroscopy With Abrasion Arthroplasty  03/12/2014: ROTATOR CUFF REPAIR      Comment:  Rotator Cuff Repair  07/24/2018: SINUS SURGERY      Comment:  Sinus Surgery  12/10/2014: TONSILLECTOMY      Comment:  Tonsillectomy  05/08/2015: TOTAL KNEE ARTHROPLASTY      Comment:  Knee Replacement       OBJECTIVE    /75   Pulse 55   Temp 36 °C (96.8 °F)   Ht 1.524 m (5')   Wt 125 kg (275 lb 4.8 oz)   BMI 53.77 kg/m²    BMI: Estimated body  mass index is 53.77 kg/m² as calculated from the following:    Height as of this encounter: 1.524 m (5').    Weight as of this encounter: 125 kg (275 lb 4.8 oz).    BP Readings from Last 3 Encounters:   12/18/24 115/75   06/18/24 113/77   12/19/23 117/81      Wt Readings from Last 3 Encounters:   12/18/24 125 kg (275 lb 4.8 oz)   06/18/24 121 kg (265 lb 12.8 oz)   12/19/23 121 kg (267 lb 4.8 oz)        PHYSICAL EXAM  Physical Exam  Constitutional:       Appearance: Normal appearance.   HENT:      Head: Normocephalic and atraumatic.      Right Ear: Tympanic membrane normal.      Left Ear: Tympanic membrane normal.      Ears:      Comments: Ear canals appear dry  Cardiovascular:      Rate and Rhythm: Normal rate and regular rhythm.      Heart sounds: No murmur heard.  Pulmonary:      Effort: Pulmonary effort is normal. No respiratory distress.      Breath sounds: Normal breath sounds. No wheezing.   Abdominal:      General: There is no distension.      Palpations: Abdomen is soft.      Tenderness: There is no abdominal tenderness.   Musculoskeletal:      Right lower leg: No edema.      Left lower leg: No edema.      Comments: No tenderness over left lower ribs   Neurological:      General: No focal deficit present.      Mental Status: He is alert and oriented to person, place, and time. Mental status is at baseline.   Psychiatric:         Mood and Affect: Mood normal.         Behavior: Behavior normal.         Thought Content: Thought content normal.         Judgment: Judgment normal.          ASSESSMENT AND PLAN  Assessment/Plan   Problem List Items Addressed This Visit       Asthma    Benign essential hypertension - Primary    Relevant Orders    Comprehensive Metabolic Panel    CBC and Auto Differential    Hyperlipidemia    Relevant Orders    Lipid Panel    Type 2 diabetes mellitus    Relevant Orders    Hemoglobin A1c    Major depressive disorder, recurrent severe without psychotic features (Multi)    Overview      Stable on current medications          Other Visit Diagnoses       Chronic cough        Other chest pain        Relevant Orders    XR chest 2 views            Hypertension - controlled, continue current medication, check metabolic panel.     DM2 - Check A1c.     - Healthy habits recommended.  Aim for 30 minutes of aerobic exercise, 5 times per week.  Try to cut back on processed foods, including foods made with flour, sugar, added salt, and saturated fat.   - on atorvastatin      Mixed hyperlipidemia - continue statin, check lipid panel.      Depression - stable, continue current medications.    Left lower chest pain - intermittent, nonexertional.  Albuterol helps.  Not typically consistent with cardiac cause.  CXR ordered.       Reviewed signs of skin cancer and importance of sun protection.  Follows with dermatologist annually.      Follow-up in 6 months.

## 2024-12-19 LAB
ALBUMIN SERPL BCP-MCNC: 4.4 G/DL (ref 3.4–5)
ALP SERPL-CCNC: 102 U/L (ref 33–136)
ALT SERPL W P-5'-P-CCNC: 17 U/L (ref 10–52)
ANION GAP SERPL CALC-SCNC: 15 MMOL/L (ref 10–20)
AST SERPL W P-5'-P-CCNC: 24 U/L (ref 9–39)
BASOPHILS # BLD AUTO: 0.04 X10*3/UL (ref 0–0.1)
BASOPHILS NFR BLD AUTO: 0.5 %
BILIRUB SERPL-MCNC: 0.5 MG/DL (ref 0–1.2)
BUN SERPL-MCNC: 20 MG/DL (ref 6–23)
CALCIUM SERPL-MCNC: 9.6 MG/DL (ref 8.6–10.6)
CHLORIDE SERPL-SCNC: 99 MMOL/L (ref 98–107)
CHOLEST SERPL-MCNC: 131 MG/DL (ref 0–199)
CHOLESTEROL/HDL RATIO: 3.5
CO2 SERPL-SCNC: 29 MMOL/L (ref 21–32)
CREAT SERPL-MCNC: 0.96 MG/DL (ref 0.5–1.3)
EGFRCR SERPLBLD CKD-EPI 2021: 88 ML/MIN/1.73M*2
EOSINOPHIL # BLD AUTO: 0.19 X10*3/UL (ref 0–0.7)
EOSINOPHIL NFR BLD AUTO: 2.5 %
ERYTHROCYTE [DISTWIDTH] IN BLOOD BY AUTOMATED COUNT: 13 % (ref 11.5–14.5)
EST. AVERAGE GLUCOSE BLD GHB EST-MCNC: 120 MG/DL
GLUCOSE SERPL-MCNC: 96 MG/DL (ref 74–99)
HBA1C MFR BLD: 5.8 %
HCT VFR BLD AUTO: 42.1 % (ref 41–52)
HDLC SERPL-MCNC: 37.5 MG/DL
HGB BLD-MCNC: 13.4 G/DL (ref 13.5–17.5)
IMM GRANULOCYTES # BLD AUTO: 0.03 X10*3/UL (ref 0–0.7)
IMM GRANULOCYTES NFR BLD AUTO: 0.4 % (ref 0–0.9)
LDLC SERPL CALC-MCNC: 76 MG/DL
LYMPHOCYTES # BLD AUTO: 1.69 X10*3/UL (ref 1.2–4.8)
LYMPHOCYTES NFR BLD AUTO: 22.3 %
MCH RBC QN AUTO: 28.8 PG (ref 26–34)
MCHC RBC AUTO-ENTMCNC: 31.8 G/DL (ref 32–36)
MCV RBC AUTO: 90 FL (ref 80–100)
MONOCYTES # BLD AUTO: 0.59 X10*3/UL (ref 0.1–1)
MONOCYTES NFR BLD AUTO: 7.8 %
NEUTROPHILS # BLD AUTO: 5.05 X10*3/UL (ref 1.2–7.7)
NEUTROPHILS NFR BLD AUTO: 66.5 %
NON HDL CHOLESTEROL: 94 MG/DL (ref 0–149)
NRBC BLD-RTO: 0 /100 WBCS (ref 0–0)
PLATELET # BLD AUTO: 234 X10*3/UL (ref 150–450)
POTASSIUM SERPL-SCNC: 4 MMOL/L (ref 3.5–5.3)
PROT SERPL-MCNC: 7.2 G/DL (ref 6.4–8.2)
RBC # BLD AUTO: 4.66 X10*6/UL (ref 4.5–5.9)
SODIUM SERPL-SCNC: 139 MMOL/L (ref 136–145)
TRIGL SERPL-MCNC: 88 MG/DL (ref 0–149)
VLDL: 18 MG/DL (ref 0–40)
WBC # BLD AUTO: 7.6 X10*3/UL (ref 4.4–11.3)

## 2024-12-29 DIAGNOSIS — I10 BENIGN ESSENTIAL HYPERTENSION: ICD-10-CM

## 2024-12-29 DIAGNOSIS — J45.40 MODERATE PERSISTENT ASTHMA WITHOUT COMPLICATION (HHS-HCC): ICD-10-CM

## 2024-12-30 RX ORDER — NIFEDIPINE 30 MG/1
30 TABLET, EXTENDED RELEASE ORAL DAILY
Qty: 90 TABLET | Refills: 3 | Status: SHIPPED | OUTPATIENT
Start: 2024-12-30

## 2024-12-30 RX ORDER — MONTELUKAST SODIUM 10 MG/1
10 TABLET ORAL EVERY EVENING
Qty: 90 TABLET | Refills: 3 | Status: SHIPPED | OUTPATIENT
Start: 2024-12-30

## 2024-12-30 RX ORDER — HYDROCHLOROTHIAZIDE 25 MG/1
25 TABLET ORAL DAILY
Qty: 90 TABLET | Refills: 3 | Status: SHIPPED | OUTPATIENT
Start: 2024-12-30

## 2025-01-13 DIAGNOSIS — E78.2 MIXED HYPERLIPIDEMIA: ICD-10-CM

## 2025-01-13 RX ORDER — ATORVASTATIN CALCIUM 40 MG/1
40 TABLET, FILM COATED ORAL NIGHTLY
Qty: 90 TABLET | Refills: 3 | Status: SHIPPED | OUTPATIENT
Start: 2025-01-13

## 2025-02-12 DIAGNOSIS — F32.5 DEPRESSION, MAJOR, IN REMISSION (CMS-HCC): ICD-10-CM

## 2025-02-12 RX ORDER — VENLAFAXINE HYDROCHLORIDE 150 MG/1
CAPSULE, EXTENDED RELEASE ORAL
Qty: 180 CAPSULE | Refills: 3 | Status: SHIPPED | OUTPATIENT
Start: 2025-02-12

## 2025-02-27 DIAGNOSIS — L21.9 SEBORRHEIC DERMATITIS: ICD-10-CM

## 2025-02-27 RX ORDER — KETOCONAZOLE 20 MG/ML
SHAMPOO, SUSPENSION TOPICAL
Qty: 360 ML | Refills: 3 | Status: SHIPPED | OUTPATIENT
Start: 2025-02-27

## 2025-03-13 DIAGNOSIS — K21.9 GERD WITHOUT ESOPHAGITIS: ICD-10-CM

## 2025-03-13 RX ORDER — OMEPRAZOLE 40 MG/1
40 CAPSULE, DELAYED RELEASE ORAL DAILY
Qty: 90 CAPSULE | Refills: 3 | Status: SHIPPED | OUTPATIENT
Start: 2025-03-13

## 2025-05-31 DIAGNOSIS — F33.2 MAJOR DEPRESSIVE DISORDER, RECURRENT SEVERE WITHOUT PSYCHOTIC FEATURES (MULTI): ICD-10-CM

## 2025-06-01 RX ORDER — LAMOTRIGINE 25 MG/1
TABLET ORAL DAILY
Qty: 90 TABLET | Refills: 3 | Status: SHIPPED | OUTPATIENT
Start: 2025-06-01

## 2025-06-23 ENCOUNTER — APPOINTMENT (OUTPATIENT)
Dept: PRIMARY CARE | Facility: CLINIC | Age: 65
End: 2025-06-23
Payer: MEDICARE

## 2025-06-23 VITALS
HEART RATE: 55 BPM | TEMPERATURE: 97.7 F | HEIGHT: 70 IN | BODY MASS INDEX: 39.21 KG/M2 | DIASTOLIC BLOOD PRESSURE: 82 MMHG | WEIGHT: 273.9 LBS | SYSTOLIC BLOOD PRESSURE: 122 MMHG | OXYGEN SATURATION: 96 %

## 2025-06-23 DIAGNOSIS — Z00.00 MEDICARE ANNUAL WELLNESS VISIT, SUBSEQUENT: Primary | ICD-10-CM

## 2025-06-23 DIAGNOSIS — E66.812 CLASS 2 SEVERE OBESITY DUE TO EXCESS CALORIES WITH SERIOUS COMORBIDITY AND BODY MASS INDEX (BMI) OF 39.0 TO 39.9 IN ADULT: ICD-10-CM

## 2025-06-23 DIAGNOSIS — Z12.11 COLON CANCER SCREENING: ICD-10-CM

## 2025-06-23 DIAGNOSIS — Z12.5 PROSTATE CANCER SCREENING: ICD-10-CM

## 2025-06-23 DIAGNOSIS — F33.2 MAJOR DEPRESSIVE DISORDER, RECURRENT SEVERE WITHOUT PSYCHOTIC FEATURES (MULTI): ICD-10-CM

## 2025-06-23 DIAGNOSIS — E78.2 MIXED HYPERLIPIDEMIA: ICD-10-CM

## 2025-06-23 DIAGNOSIS — Z13.89 SCREENING FOR MULTIPLE CONDITIONS: ICD-10-CM

## 2025-06-23 DIAGNOSIS — G47.33 OSA ON CPAP: ICD-10-CM

## 2025-06-23 DIAGNOSIS — E66.01 CLASS 2 SEVERE OBESITY DUE TO EXCESS CALORIES WITH SERIOUS COMORBIDITY AND BODY MASS INDEX (BMI) OF 39.0 TO 39.9 IN ADULT: ICD-10-CM

## 2025-06-23 DIAGNOSIS — Z12.5 SCREENING FOR MALIGNANT NEOPLASM OF PROSTATE: ICD-10-CM

## 2025-06-23 DIAGNOSIS — E11.65 TYPE 2 DIABETES MELLITUS WITH HYPERGLYCEMIA, WITHOUT LONG-TERM CURRENT USE OF INSULIN: ICD-10-CM

## 2025-06-23 DIAGNOSIS — I10 BENIGN ESSENTIAL HYPERTENSION: ICD-10-CM

## 2025-06-23 PROBLEM — E66.09 CLASS 2 OBESITY DUE TO EXCESS CALORIES WITH BODY MASS INDEX (BMI) OF 39.0 TO 39.9 IN ADULT: Status: ACTIVE | Noted: 2025-06-23

## 2025-06-23 PROCEDURE — 3008F BODY MASS INDEX DOCD: CPT | Performed by: INTERNAL MEDICINE

## 2025-06-23 PROCEDURE — 1158F ADVNC CARE PLAN TLK DOCD: CPT | Performed by: INTERNAL MEDICINE

## 2025-06-23 PROCEDURE — 3074F SYST BP LT 130 MM HG: CPT | Performed by: INTERNAL MEDICINE

## 2025-06-23 PROCEDURE — 3079F DIAST BP 80-89 MM HG: CPT | Performed by: INTERNAL MEDICINE

## 2025-06-23 PROCEDURE — G0439 PPPS, SUBSEQ VISIT: HCPCS | Performed by: INTERNAL MEDICINE

## 2025-06-23 PROCEDURE — 1036F TOBACCO NON-USER: CPT | Performed by: INTERNAL MEDICINE

## 2025-06-23 PROCEDURE — 1157F ADVNC CARE PLAN IN RCRD: CPT | Performed by: INTERNAL MEDICINE

## 2025-06-23 PROCEDURE — 1160F RVW MEDS BY RX/DR IN RCRD: CPT | Performed by: INTERNAL MEDICINE

## 2025-06-23 PROCEDURE — 1123F ACP DISCUSS/DSCN MKR DOCD: CPT | Performed by: INTERNAL MEDICINE

## 2025-06-23 PROCEDURE — 1159F MED LIST DOCD IN RCRD: CPT | Performed by: INTERNAL MEDICINE

## 2025-06-23 PROCEDURE — 1170F FXNL STATUS ASSESSED: CPT | Performed by: INTERNAL MEDICINE

## 2025-06-23 PROCEDURE — G0444 DEPRESSION SCREEN ANNUAL: HCPCS | Performed by: INTERNAL MEDICINE

## 2025-06-23 PROCEDURE — 99214 OFFICE O/P EST MOD 30 MIN: CPT | Performed by: INTERNAL MEDICINE

## 2025-06-23 RX ORDER — TIRZEPATIDE 2.5 MG/.5ML
2.5 INJECTION, SOLUTION SUBCUTANEOUS WEEKLY
Qty: 2 ML | Refills: 0 | Status: SHIPPED | OUTPATIENT
Start: 2025-06-23

## 2025-06-23 ASSESSMENT — PATIENT HEALTH QUESTIONNAIRE - PHQ9
2. FEELING DOWN, DEPRESSED OR HOPELESS: NOT AT ALL
1. LITTLE INTEREST OR PLEASURE IN DOING THINGS: NOT AT ALL
SUM OF ALL RESPONSES TO PHQ9 QUESTIONS 1 AND 2: 0

## 2025-06-23 ASSESSMENT — ACTIVITIES OF DAILY LIVING (ADL)
TAKING_MEDICATION: INDEPENDENT
DRESSING: INDEPENDENT
GROCERY_SHOPPING: INDEPENDENT
DOING_HOUSEWORK: INDEPENDENT
MANAGING_FINANCES: INDEPENDENT
BATHING: INDEPENDENT

## 2025-06-23 ASSESSMENT — ENCOUNTER SYMPTOMS
FATIGUE: 0
DYSURIA: 0
DIZZINESS: 0
HEMATURIA: 0
DYSPHORIC MOOD: 0
FEVER: 0
SHORTNESS OF BREATH: 0
BACK PAIN: 1
CONSTIPATION: 0
LIGHT-HEADEDNESS: 0
CHILLS: 0
PALPITATIONS: 0
NAUSEA: 0
WHEEZING: 0
COUGH: 0
ARTHRALGIAS: 1
DIARRHEA: 0
ABDOMINAL PAIN: 0

## 2025-06-23 NOTE — PROGRESS NOTES
CHIEF COMPLAINT  Medicare Annual Wellness Visit Subsequent, Hyperlipidemia, Hypertension, and Diabetes    HISTORY OF PRESENT ILLNESS  Dakotah Orta is a 65 y.o. male presents today for follow up of Medicare Annual Wellness Visit Subsequent, Hyperlipidemia, Hypertension, and Diabetes    HPI    Past Medical, Surgical, and Family History reviewed and updated in chart.  Reviewed all medications by prescribing practitioner or clinical pharmacist (such as prescriptions, OTCs, herbal therapies and supplements) and documented in the medical record.    Pain in right low back, radiates into hip.  Started after mowing lawn - 4 hours on zero turn mower.  Saw Dr. Hummel for this last week and was prescribed meloxicam.  Has history of low back and hip surgery.    He is interested in Mounjaro for treatment of diabetes.  Fasting for routine labs.    REVIEW OF SYSTEMS  Review of Systems   Constitutional:  Negative for chills, fatigue and fever.   HENT:  Positive for tinnitus.    Respiratory:  Negative for cough, shortness of breath and wheezing.    Cardiovascular:  Negative for chest pain, palpitations and leg swelling.   Gastrointestinal:  Negative for abdominal pain, constipation, diarrhea and nausea.   Genitourinary:  Negative for dysuria and hematuria.   Musculoskeletal:  Positive for arthralgias and back pain.   Skin:  Negative for rash.   Neurological:  Negative for dizziness and light-headedness.   Psychiatric/Behavioral:  Negative for dysphoric mood.        ALLERGIES  Patient has no known allergies.    MEDICATIONS  Current Outpatient Medications   Medication Instructions    albuterol 90 mcg/actuation inhaler     atorvastatin (LIPITOR) 40 mg, oral, Nightly    cholecalciferol (Vitamin D-3) 25 MCG (1000 UT) capsule 0.5 capsules, 2 times daily    dextran 70-hypromellose drops 1 drop, As needed    doxycycline (PERIOSTAT) 20 mg, oral, 2 times daily, Take with a full glass of water and do not lie down for at least 30 minutes after.     fluticasone (Flonase) 50 mcg/actuation nasal spray 1 spray, 2 times daily    hydroCHLOROthiazide (HYDRODIURIL) 25 mg, oral, Daily    ibuprofen 800 mg, Every 8 hours PRN    ketoconazole (NIZOral) 2 % cream Topical, Daily    ketoconazole (NIZOral) 2 % shampoo APPLY TO SCALP 1-2 TIMES WEEKLY. INCREASE, DECREASE USE AS NEEDED. ALLOW TO SIT FOR 5 MINUTES BEFORE RINSING.    lamoTRIgine (LaMICtal) 25 mg tablet oral, Daily    lancets (Fingerstix Lancets) misc Test twice daily    metFORMIN XR (GLUCOPHAGE-XR) 500 mg, oral, 2 times daily    metroNIDAZOLE (Metrogel) 0.75 % gel APPLY 1 APPLICATION TWICE A DAY TOPICALLY TO AREAS ON FACE    montelukast (SINGULAIR) 10 mg, oral, Every evening    Mounjaro 2.5 mg, subcutaneous, Weekly    nebivolol (BYSTOLIC) 5 mg, oral, Daily    NIFEdipine ER (ADALAT CC) 30 mg, oral, Daily    omeprazole (PRILOSEC) 40 mg, oral, Daily    OneTouch Ultra Test strip Check fasting blood sugar once daily.  Record in log book.    polyethylene glycol (MIRALAX) 17 g, Daily    venlafaxine XR (Effexor-XR) 150 mg 24 hr capsule TAKE 2 CAPSULES ONE TIME DAILY    vitamin B complex tablet 1 tablet, Daily       TOBACCO USE  Tobacco Use History[1]    DEPRESSION SCREEN  Over the past 2 weeks, how often have you been bothered by any of the following problems?  Little interest or pleasure in doing things: Not at all  Feeling down, depressed, or hopeless: Not at all    SURGICAL HISTORY  Past Surgical History:  03/12/2014: CARPAL TUNNEL RELEASE      Comment:  Neuroplasty Decompression Median Nerve At Carpal Tunnel  07/12/2021: COLONOSCOPY      Comment:  Colonoscopy (Fiberoptic)  07/12/2021: OTHER SURGICAL HISTORY      Comment:  Cervical surgery  12/13/2022: OTHER SURGICAL HISTORY      Comment:  Revision of hip arthroplasty  12/13/2022: OTHER SURGICAL HISTORY      Comment:  Lower back surgery  12/13/2022: OTHER SURGICAL HISTORY      Comment:  Ventral hernia repair  12/13/2022: OTHER SURGICAL HISTORY      Comment:  Hip  "replacement  03/12/2014: OTHER SURGICAL HISTORY      Comment:  Knee Arthroscopy With Medial And Lateral Meniscectomy  03/12/2014: OTHER SURGICAL HISTORY      Comment:  Knee Arthroscopy With Abrasion Arthroplasty  03/12/2014: ROTATOR CUFF REPAIR      Comment:  Rotator Cuff Repair  07/24/2018: SINUS SURGERY      Comment:  Sinus Surgery  12/10/2014: TONSILLECTOMY      Comment:  Tonsillectomy  05/08/2015: TOTAL KNEE ARTHROPLASTY      Comment:  Knee Replacement       OBJECTIVE    /82   Pulse 55   Temp 36.5 °C (97.7 °F)   Ht 1.778 m (5' 10\")   Wt 124 kg (273 lb 14.4 oz)   SpO2 96%   BMI 39.30 kg/m²    BMI: Estimated body mass index is 39.3 kg/m² as calculated from the following:    Height as of this encounter: 1.778 m (5' 10\").    Weight as of this encounter: 124 kg (273 lb 14.4 oz).    BP Readings from Last 3 Encounters:   06/23/25 122/82   12/18/24 115/75   06/18/24 113/77      Wt Readings from Last 3 Encounters:   06/23/25 124 kg (273 lb 14.4 oz)   12/18/24 125 kg (275 lb 4.8 oz)   06/18/24 121 kg (265 lb 12.8 oz)        PHYSICAL EXAM  Physical Exam  Constitutional:       Appearance: Normal appearance.   HENT:      Head: Normocephalic and atraumatic.      Right Ear: Tympanic membrane and ear canal normal.      Left Ear: Tympanic membrane and ear canal normal.      Ears:      Comments: Ear canals appear dry  Neck:      Vascular: No carotid bruit.   Cardiovascular:      Rate and Rhythm: Normal rate and regular rhythm.      Heart sounds: No murmur heard.  Pulmonary:      Effort: Pulmonary effort is normal. No respiratory distress.      Breath sounds: Normal breath sounds. No wheezing.   Abdominal:      General: There is no distension.      Palpations: Abdomen is soft.      Tenderness: There is no abdominal tenderness.   Musculoskeletal:      Right lower leg: No edema.      Left lower leg: No edema.      Comments: Negative SLR on right    Neurological:      General: No focal deficit present.      Mental Status: " He is alert and oriented to person, place, and time. Mental status is at baseline.   Psychiatric:         Mood and Affect: Mood normal.         Behavior: Behavior normal.         Thought Content: Thought content normal.         Judgment: Judgment normal.          ASSESSMENT AND PLAN  Assessment/Plan   Problem List Items Addressed This Visit       Benign essential hypertension    Relevant Orders    CBC and Auto Differential    Hyperlipidemia    Relevant Orders    Lipid Panel    Comprehensive Metabolic Panel    RACHEL on CPAP    Overview   Patient is compliant with CPAP use and is benefiting from therapy.            Type 2 diabetes mellitus    Relevant Medications    tirzepatide (Mounjaro) 2.5 mg/0.5 mL pen injector    Other Relevant Orders    Albumin-Creatinine Ratio, Urine Random    Hemoglobin A1c    Medicare annual wellness visit, subsequent - Primary    Major depressive disorder, recurrent severe without psychotic features (Multi)    Overview   Stable on current medications         Prostate cancer screening    Screening for multiple conditions    Overview   5 minutes were spent screening for depression using PHQ2/PHQ9 as documented in the chart.           Class 2 obesity due to excess calories with body mass index (BMI) of 39.0 to 39.9 in adult    Colon cancer screening    Relevant Orders    Colonoscopy Screening; Average Risk Patient    Screening for malignant neoplasm of prostate    Relevant Orders    Prostate Specific Antigen, Screen       Medicare Wellness Visit completed.     Hypertension - controlled, continue current medication.     DM2 - Check A1c.    - urine albumin / creatinine ratio, BUN, Cr, GFR 6/23/25  - add Mounjaro to current regimen  - on statin     Mixed hyperlipidemia - on statin.    Routine fasting labs - CBC, CMP, FLP.     Depression - stable, continue current medications.      Prostate cancer screening - PSA 12/13/2022, 6/21/254, 6/23/25.     Colonoscopy 8/6/2018, due 2023.  Reminded him to  schedule June 2023, new colonoscopy order placed June 2024, ordered again June 2025.      Reviewed signs of skin cancer and importance of sun protection.  Follows with dermatologist annually.      Continue to stay current with routine dental and eye exams.     Flu shot & COVID booster recommended annually in the Fall.      Follow-up in 3 months.            [1]   Social History  Tobacco Use   Smoking Status Never   Smokeless Tobacco Never

## 2025-06-24 LAB
ALBUMIN SERPL-MCNC: 4.7 G/DL (ref 3.6–5.1)
ALBUMIN/CREAT UR: 6 MG/G CREAT
ALP SERPL-CCNC: 108 U/L (ref 35–144)
ALT SERPL-CCNC: 20 U/L (ref 9–46)
ANION GAP SERPL CALCULATED.4IONS-SCNC: 9 MMOL/L (CALC) (ref 7–17)
AST SERPL-CCNC: 27 U/L (ref 10–35)
BASOPHILS # BLD AUTO: 62 CELLS/UL (ref 0–200)
BASOPHILS NFR BLD AUTO: 0.8 %
BILIRUB SERPL-MCNC: 0.7 MG/DL (ref 0.2–1.2)
BUN SERPL-MCNC: 20 MG/DL (ref 7–25)
CALCIUM SERPL-MCNC: 10 MG/DL (ref 8.6–10.3)
CHLORIDE SERPL-SCNC: 99 MMOL/L (ref 98–110)
CHOLEST SERPL-MCNC: 143 MG/DL
CHOLEST/HDLC SERPL: 4.1 (CALC)
CO2 SERPL-SCNC: 30 MMOL/L (ref 20–32)
CREAT SERPL-MCNC: 1.03 MG/DL (ref 0.7–1.35)
CREAT UR-MCNC: 224 MG/DL (ref 20–320)
EGFRCR SERPLBLD CKD-EPI 2021: 81 ML/MIN/1.73M2
EOSINOPHIL # BLD AUTO: 218 CELLS/UL (ref 15–500)
EOSINOPHIL NFR BLD AUTO: 2.8 %
ERYTHROCYTE [DISTWIDTH] IN BLOOD BY AUTOMATED COUNT: 13.3 % (ref 11–15)
EST. AVERAGE GLUCOSE BLD GHB EST-MCNC: 131 MG/DL
EST. AVERAGE GLUCOSE BLD GHB EST-SCNC: 7.3 MMOL/L
GLUCOSE SERPL-MCNC: 107 MG/DL (ref 65–99)
HBA1C MFR BLD: 6.2 %
HCT VFR BLD AUTO: 46.3 % (ref 38.5–50)
HDLC SERPL-MCNC: 35 MG/DL
HGB BLD-MCNC: 14.4 G/DL (ref 13.2–17.1)
LDLC SERPL CALC-MCNC: 83 MG/DL (CALC)
LYMPHOCYTES # BLD AUTO: 1763 CELLS/UL (ref 850–3900)
LYMPHOCYTES NFR BLD AUTO: 22.6 %
MCH RBC QN AUTO: 28.3 PG (ref 27–33)
MCHC RBC AUTO-ENTMCNC: 31.1 G/DL (ref 32–36)
MCV RBC AUTO: 91.1 FL (ref 80–100)
MICROALBUMIN UR-MCNC: 1.3 MG/DL
MONOCYTES # BLD AUTO: 569 CELLS/UL (ref 200–950)
MONOCYTES NFR BLD AUTO: 7.3 %
NEUTROPHILS # BLD AUTO: 5187 CELLS/UL (ref 1500–7800)
NEUTROPHILS NFR BLD AUTO: 66.5 %
NONHDLC SERPL-MCNC: 108 MG/DL (CALC)
PLATELET # BLD AUTO: 257 THOUSAND/UL (ref 140–400)
PMV BLD REES-ECKER: 9.8 FL (ref 7.5–12.5)
POTASSIUM SERPL-SCNC: 4.8 MMOL/L (ref 3.5–5.3)
PROT SERPL-MCNC: 7.4 G/DL (ref 6.1–8.1)
PSA SERPL-MCNC: 1.86 NG/ML
RBC # BLD AUTO: 5.08 MILLION/UL (ref 4.2–5.8)
SODIUM SERPL-SCNC: 138 MMOL/L (ref 135–146)
TRIGL SERPL-MCNC: 156 MG/DL
WBC # BLD AUTO: 7.8 THOUSAND/UL (ref 3.8–10.8)

## 2025-07-03 ENCOUNTER — APPOINTMENT (OUTPATIENT)
Dept: PRIMARY CARE | Facility: CLINIC | Age: 65
End: 2025-07-03
Payer: MEDICARE

## 2025-07-18 DIAGNOSIS — E11.65 TYPE 2 DIABETES MELLITUS WITH HYPERGLYCEMIA, WITHOUT LONG-TERM CURRENT USE OF INSULIN: Primary | ICD-10-CM

## 2025-07-18 RX ORDER — TIRZEPATIDE 5 MG/.5ML
5 INJECTION, SOLUTION SUBCUTANEOUS WEEKLY
Qty: 2 ML | Refills: 0 | Status: SHIPPED | OUTPATIENT
Start: 2025-07-18

## 2025-07-30 DIAGNOSIS — E11.65 TYPE 2 DIABETES MELLITUS WITH HYPERGLYCEMIA, WITHOUT LONG-TERM CURRENT USE OF INSULIN: ICD-10-CM

## 2025-07-31 RX ORDER — METFORMIN HYDROCHLORIDE 500 MG/1
500 TABLET, EXTENDED RELEASE ORAL 2 TIMES DAILY
Qty: 180 TABLET | Refills: 3 | Status: SHIPPED | OUTPATIENT
Start: 2025-07-31

## 2025-08-11 ENCOUNTER — TELEPHONE (OUTPATIENT)
Dept: PRIMARY CARE | Facility: CLINIC | Age: 65
End: 2025-08-11
Payer: MEDICARE

## 2025-08-11 DIAGNOSIS — E11.65 TYPE 2 DIABETES MELLITUS WITH HYPERGLYCEMIA, WITHOUT LONG-TERM CURRENT USE OF INSULIN: ICD-10-CM

## 2025-08-11 RX ORDER — TIRZEPATIDE 5 MG/.5ML
5 INJECTION, SOLUTION SUBCUTANEOUS WEEKLY
Qty: 2 ML | Refills: 11 | Status: SHIPPED | OUTPATIENT
Start: 2025-08-11

## 2025-09-23 ENCOUNTER — APPOINTMENT (OUTPATIENT)
Dept: PRIMARY CARE | Facility: CLINIC | Age: 65
End: 2025-09-23
Payer: MEDICARE

## 2025-10-22 ENCOUNTER — APPOINTMENT (OUTPATIENT)
Dept: DERMATOLOGY | Facility: CLINIC | Age: 65
End: 2025-10-22
Payer: MEDICARE

## 2026-06-30 ENCOUNTER — APPOINTMENT (OUTPATIENT)
Dept: PRIMARY CARE | Facility: CLINIC | Age: 66
End: 2026-06-30
Payer: MEDICARE